# Patient Record
Sex: MALE | URBAN - METROPOLITAN AREA
[De-identification: names, ages, dates, MRNs, and addresses within clinical notes are randomized per-mention and may not be internally consistent; named-entity substitution may affect disease eponyms.]

---

## 2020-01-01 ENCOUNTER — INFUSION - HEALTHEAST (OUTPATIENT)
Dept: INFUSION THERAPY | Facility: CLINIC | Age: 60
End: 2020-01-01

## 2020-01-01 ENCOUNTER — COMMUNICATION - HEALTHEAST (OUTPATIENT)
Dept: SCHEDULING | Facility: CLINIC | Age: 60
End: 2020-01-01

## 2020-01-01 ENCOUNTER — AMBULATORY - HEALTHEAST (OUTPATIENT)
Dept: INFUSION THERAPY | Facility: CLINIC | Age: 60
End: 2020-01-01

## 2020-01-01 ENCOUNTER — COMMUNICATION - HEALTHEAST (OUTPATIENT)
Dept: ONCOLOGY | Facility: HOSPITAL | Age: 60
End: 2020-01-01

## 2020-01-01 ENCOUNTER — AMBULATORY - HEALTHEAST (OUTPATIENT)
Dept: RADIATION ONCOLOGY | Facility: HOSPITAL | Age: 60
End: 2020-01-01

## 2020-01-01 ENCOUNTER — COMMUNICATION - HEALTHEAST (OUTPATIENT)
Dept: ONCOLOGY | Facility: CLINIC | Age: 60
End: 2020-01-01

## 2020-01-01 ENCOUNTER — HOME INFUSION (PRE-WILLOW HOME INFUSION) (OUTPATIENT)
Dept: PHARMACY | Facility: CLINIC | Age: 60
End: 2020-01-01

## 2020-01-01 ENCOUNTER — OFFICE VISIT - HEALTHEAST (OUTPATIENT)
Dept: ONCOLOGY | Facility: CLINIC | Age: 60
End: 2020-01-01

## 2020-01-01 ENCOUNTER — HOSPITAL ENCOUNTER (OUTPATIENT)
Dept: MRI IMAGING | Facility: CLINIC | Age: 60
Setting detail: RADIATION/ONCOLOGY SERIES
Discharge: STILL ADMITTED OR ADMINISTRATIVE | End: 2020-07-06
Attending: INTERNAL MEDICINE

## 2020-01-01 ENCOUNTER — ANESTHESIA - HEALTHEAST (OUTPATIENT)
Dept: SURGERY | Facility: CLINIC | Age: 60
End: 2020-01-01

## 2020-01-01 ENCOUNTER — OFFICE VISIT - HEALTHEAST (OUTPATIENT)
Dept: RADIATION ONCOLOGY | Facility: HOSPITAL | Age: 60
End: 2020-01-01

## 2020-01-01 ENCOUNTER — AMBULATORY - HEALTHEAST (OUTPATIENT)
Dept: ONCOLOGY | Facility: CLINIC | Age: 60
End: 2020-01-01

## 2020-01-01 ENCOUNTER — SURGERY - HEALTHEAST (OUTPATIENT)
Dept: SURGERY | Facility: CLINIC | Age: 60
End: 2020-01-01

## 2020-01-01 ENCOUNTER — RECORDS - HEALTHEAST (OUTPATIENT)
Dept: SCHEDULING | Facility: CLINIC | Age: 60
End: 2020-01-01

## 2020-01-01 ENCOUNTER — NURSE TRIAGE (OUTPATIENT)
Dept: NURSING | Facility: CLINIC | Age: 60
End: 2020-01-01

## 2020-01-01 ENCOUNTER — DOCUMENTATION ONLY (OUTPATIENT)
Dept: OTHER | Facility: CLINIC | Age: 60
End: 2020-01-01

## 2020-01-01 ENCOUNTER — HOSPITAL ENCOUNTER (OUTPATIENT)
Dept: PET IMAGING | Facility: HOSPITAL | Age: 60
Setting detail: RADIATION/ONCOLOGY SERIES
Discharge: STILL ADMITTED OR ADMINISTRATIVE | End: 2020-08-03
Attending: INTERNAL MEDICINE

## 2020-01-01 ENCOUNTER — AMBULATORY - HEALTHEAST (OUTPATIENT)
Dept: OTHER | Facility: CLINIC | Age: 60
End: 2020-01-01

## 2020-01-01 ENCOUNTER — AMBULATORY - HEALTHEAST (OUTPATIENT)
Dept: ONCOLOGY | Facility: HOSPITAL | Age: 60
End: 2020-01-01

## 2020-01-01 ENCOUNTER — RECORDS - HEALTHEAST (OUTPATIENT)
Dept: ADMINISTRATIVE | Facility: OTHER | Age: 60
End: 2020-01-01

## 2020-01-01 ENCOUNTER — HOSPITAL ENCOUNTER (OUTPATIENT)
Dept: INTERVENTIONAL RADIOLOGY/VASCULAR | Facility: HOSPITAL | Age: 60
Discharge: HOME | End: 2020-12-02
Attending: FAMILY MEDICINE | Admitting: RADIOLOGY

## 2020-01-01 ENCOUNTER — COMMUNICATION - HEALTHEAST (OUTPATIENT)
Dept: NUTRITION | Facility: HOSPITAL | Age: 60
End: 2020-01-01

## 2020-01-01 ENCOUNTER — COMMUNICATION - HEALTHEAST (OUTPATIENT)
Dept: LAB | Facility: CLINIC | Age: 60
End: 2020-01-01

## 2020-01-01 ENCOUNTER — AMBULATORY - HEALTHEAST (OUTPATIENT)
Dept: FAMILY MEDICINE | Facility: CLINIC | Age: 60
End: 2020-01-01

## 2020-01-01 ENCOUNTER — OFFICE VISIT - HEALTHEAST (OUTPATIENT)
Dept: ONCOLOGY | Facility: HOSPITAL | Age: 60
End: 2020-01-01

## 2020-01-01 ENCOUNTER — AMBULATORY - HEALTHEAST (OUTPATIENT)
Dept: ULTRASOUND IMAGING | Facility: HOSPITAL | Age: 60
End: 2020-01-01

## 2020-01-01 DIAGNOSIS — C25.0 MALIGNANT NEOPLASM OF HEAD OF PANCREAS (H): ICD-10-CM

## 2020-01-01 DIAGNOSIS — Z51.11 ENCOUNTER FOR ANTINEOPLASTIC CHEMOTHERAPY: ICD-10-CM

## 2020-01-01 DIAGNOSIS — C78.7 LIVER METASTASES: ICD-10-CM

## 2020-01-01 DIAGNOSIS — C25.9 MALIGNANT NEOPLASM OF PANCREAS, UNSPECIFIED LOCATION OF MALIGNANCY (H): ICD-10-CM

## 2020-01-01 DIAGNOSIS — E83.52 HYPERCALCEMIA OF MALIGNANCY: ICD-10-CM

## 2020-01-01 DIAGNOSIS — C79.51 BONE METASTASES: ICD-10-CM

## 2020-01-01 DIAGNOSIS — E43 SEVERE PROTEIN-CALORIE MALNUTRITION (GOMEZ: LESS THAN 60% OF STANDARD WEIGHT) (H): ICD-10-CM

## 2020-01-01 DIAGNOSIS — C78.01 MALIGNANT NEOPLASM METASTATIC TO BOTH LUNGS (H): ICD-10-CM

## 2020-01-01 DIAGNOSIS — R13.19 ESOPHAGEAL DYSPHAGIA: ICD-10-CM

## 2020-01-01 DIAGNOSIS — G89.3 CANCER ASSOCIATED PAIN: ICD-10-CM

## 2020-01-01 DIAGNOSIS — T45.1X5A CHEMOTHERAPY-INDUCED THROMBOCYTOPENIA: ICD-10-CM

## 2020-01-01 DIAGNOSIS — C78.02 MALIGNANT NEOPLASM METASTATIC TO BOTH LUNGS (H): ICD-10-CM

## 2020-01-01 DIAGNOSIS — C78.89 METASTASIS TO SPLEEN (H): ICD-10-CM

## 2020-01-01 DIAGNOSIS — R18.8 OTHER ASCITES: ICD-10-CM

## 2020-01-01 DIAGNOSIS — C25.9 PANCREATIC CANCER (H): ICD-10-CM

## 2020-01-01 DIAGNOSIS — Z11.59 ENCOUNTER FOR SCREENING FOR OTHER VIRAL DISEASES: ICD-10-CM

## 2020-01-01 DIAGNOSIS — D69.59 CHEMOTHERAPY-INDUCED THROMBOCYTOPENIA: ICD-10-CM

## 2020-01-01 DIAGNOSIS — K59.03 CONSTIPATION DUE TO OPIOID THERAPY: ICD-10-CM

## 2020-01-01 DIAGNOSIS — T40.2X5A CONSTIPATION DUE TO OPIOID THERAPY: ICD-10-CM

## 2020-01-01 LAB
ALBUMIN SERPL-MCNC: 2.5 G/DL (ref 3.5–5)
ALBUMIN SERPL-MCNC: 2.6 G/DL (ref 3.5–5)
ALBUMIN SERPL-MCNC: 2.6 G/DL (ref 3.5–5)
ALBUMIN SERPL-MCNC: 2.8 G/DL (ref 3.5–5)
ALBUMIN SERPL-MCNC: 3 G/DL (ref 3.5–5)
ALBUMIN SERPL-MCNC: 3 G/DL (ref 3.5–5)
ALBUMIN SERPL-MCNC: 3.2 G/DL (ref 3.5–5)
ALBUMIN SERPL-MCNC: 3.3 G/DL (ref 3.5–5)
ALP SERPL-CCNC: 145 U/L (ref 45–120)
ALP SERPL-CCNC: 198 U/L (ref 45–120)
ALP SERPL-CCNC: 240 U/L (ref 45–120)
ALP SERPL-CCNC: 241 U/L (ref 45–120)
ALP SERPL-CCNC: 243 U/L (ref 45–120)
ALP SERPL-CCNC: 274 U/L (ref 45–120)
ALP SERPL-CCNC: 295 U/L (ref 45–120)
ALP SERPL-CCNC: 296 U/L (ref 45–120)
ALP SERPL-CCNC: 313 U/L (ref 45–120)
ALP SERPL-CCNC: 331 U/L (ref 45–120)
ALT SERPL W P-5'-P-CCNC: 11 U/L (ref 0–45)
ALT SERPL W P-5'-P-CCNC: 12 U/L (ref 0–45)
ALT SERPL W P-5'-P-CCNC: 13 U/L (ref 0–45)
ALT SERPL W P-5'-P-CCNC: 13 U/L (ref 0–45)
ALT SERPL W P-5'-P-CCNC: 14 U/L (ref 0–45)
ALT SERPL W P-5'-P-CCNC: 14 U/L (ref 0–45)
ALT SERPL W P-5'-P-CCNC: 15 U/L (ref 0–45)
ALT SERPL W P-5'-P-CCNC: 15 U/L (ref 0–45)
ALT SERPL W P-5'-P-CCNC: 19 U/L (ref 0–45)
ALT SERPL W P-5'-P-CCNC: 9 U/L (ref 0–45)
ANION GAP SERPL CALCULATED.3IONS-SCNC: 20 MMOL/L (ref 5–18)
ANION GAP SERPL CALCULATED.3IONS-SCNC: 5 MMOL/L (ref 5–18)
ANION GAP SERPL CALCULATED.3IONS-SCNC: 5 MMOL/L (ref 5–18)
ANION GAP SERPL CALCULATED.3IONS-SCNC: 6 MMOL/L (ref 5–18)
ANION GAP SERPL CALCULATED.3IONS-SCNC: 7 MMOL/L (ref 5–18)
ANION GAP SERPL CALCULATED.3IONS-SCNC: 7 MMOL/L (ref 5–18)
ANION GAP SERPL CALCULATED.3IONS-SCNC: 8 MMOL/L (ref 5–18)
ANION GAP SERPL CALCULATED.3IONS-SCNC: 9 MMOL/L (ref 5–18)
AST SERPL W P-5'-P-CCNC: 17 U/L (ref 0–40)
AST SERPL W P-5'-P-CCNC: 31 U/L (ref 0–40)
AST SERPL W P-5'-P-CCNC: 37 U/L (ref 0–40)
AST SERPL W P-5'-P-CCNC: 38 U/L (ref 0–40)
AST SERPL W P-5'-P-CCNC: 39 U/L (ref 0–40)
AST SERPL W P-5'-P-CCNC: 40 U/L (ref 0–40)
AST SERPL W P-5'-P-CCNC: 41 U/L (ref 0–40)
AST SERPL W P-5'-P-CCNC: 42 U/L (ref 0–40)
AST SERPL W P-5'-P-CCNC: 46 U/L (ref 0–40)
AST SERPL W P-5'-P-CCNC: 52 U/L (ref 0–40)
BASO STIPL BLD QL SMEAR: ABNORMAL
BASO STIPL BLD QL SMEAR: ABNORMAL
BASOPHILS # BLD AUTO: 0 THOU/UL (ref 0–0.2)
BASOPHILS NFR BLD AUTO: 0 % (ref 0–2)
BILIRUB SERPL-MCNC: 0.5 MG/DL (ref 0–1)
BILIRUB SERPL-MCNC: 0.6 MG/DL (ref 0–1)
BILIRUB SERPL-MCNC: 0.7 MG/DL (ref 0–1)
BILIRUB SERPL-MCNC: 0.7 MG/DL (ref 0–1)
BILIRUB SERPL-MCNC: 0.9 MG/DL (ref 0–1)
BILIRUB SERPL-MCNC: 2.1 MG/DL (ref 0–1)
BUN SERPL-MCNC: 10 MG/DL (ref 8–22)
BUN SERPL-MCNC: 11 MG/DL (ref 8–22)
BUN SERPL-MCNC: 13 MG/DL (ref 8–22)
BUN SERPL-MCNC: 13 MG/DL (ref 8–22)
BUN SERPL-MCNC: 15 MG/DL (ref 8–22)
BUN SERPL-MCNC: 15 MG/DL (ref 8–22)
BUN SERPL-MCNC: 16 MG/DL (ref 8–22)
BUN SERPL-MCNC: 16 MG/DL (ref 8–22)
BUN SERPL-MCNC: 43 MG/DL (ref 8–22)
BUN SERPL-MCNC: 9 MG/DL (ref 8–22)
CALCIUM SERPL-MCNC: 11.6 MG/DL (ref 8.5–10.5)
CALCIUM SERPL-MCNC: 12.3 MG/DL (ref 8.5–10.5)
CALCIUM SERPL-MCNC: 13.1 MG/DL (ref 8.5–10.5)
CALCIUM SERPL-MCNC: 13.3 MG/DL (ref 8.5–10.5)
CALCIUM SERPL-MCNC: 13.5 MG/DL (ref 8.5–10.5)
CALCIUM SERPL-MCNC: 7.4 MG/DL (ref 8.5–10.5)
CALCIUM SERPL-MCNC: 7.4 MG/DL (ref 8.5–10.5)
CALCIUM SERPL-MCNC: 8.2 MG/DL (ref 8.5–10.5)
CALCIUM SERPL-MCNC: 8.7 MG/DL (ref 8.5–10.5)
CALCIUM SERPL-MCNC: 9.9 MG/DL (ref 8.5–10.5)
CANCER AG19-9 SERPL IA-ACNC: 26 U/ML (ref 0–37)
CANCER AG19-9 SERPL IA-ACNC: 58 U/ML (ref 0–37)
CANCER AG19-9 SERPL IA-ACNC: 60 U/ML (ref 0–37)
CANCER AG19-9 SERPL IA-ACNC: 70 U/ML (ref 0–37)
CANCER AG19-9 SERPL IA-ACNC: 71 U/ML (ref 0–37)
CANCER AG19-9 SERPL IA-ACNC: 87 U/ML (ref 0–37)
CANCER AG19-9 SERPL IA-ACNC: 91 U/ML (ref 0–37)
CHLORIDE BLD-SCNC: 103 MMOL/L (ref 98–107)
CHLORIDE BLD-SCNC: 103 MMOL/L (ref 98–107)
CHLORIDE BLD-SCNC: 104 MMOL/L (ref 98–107)
CHLORIDE BLD-SCNC: 104 MMOL/L (ref 98–107)
CHLORIDE BLD-SCNC: 106 MMOL/L (ref 98–107)
CHLORIDE BLD-SCNC: 106 MMOL/L (ref 98–107)
CHLORIDE BLD-SCNC: 107 MMOL/L (ref 98–107)
CHLORIDE BLD-SCNC: 98 MMOL/L (ref 98–107)
CO2 SERPL-SCNC: 14 MMOL/L (ref 22–31)
CO2 SERPL-SCNC: 22 MMOL/L (ref 22–31)
CO2 SERPL-SCNC: 25 MMOL/L (ref 22–31)
CO2 SERPL-SCNC: 26 MMOL/L (ref 22–31)
CO2 SERPL-SCNC: 27 MMOL/L (ref 22–31)
CO2 SERPL-SCNC: 27 MMOL/L (ref 22–31)
CREAT SERPL-MCNC: 0.61 MG/DL (ref 0.7–1.3)
CREAT SERPL-MCNC: 0.62 MG/DL (ref 0.7–1.3)
CREAT SERPL-MCNC: 0.63 MG/DL (ref 0.7–1.3)
CREAT SERPL-MCNC: 0.64 MG/DL (ref 0.7–1.3)
CREAT SERPL-MCNC: 0.64 MG/DL (ref 0.7–1.3)
CREAT SERPL-MCNC: 0.65 MG/DL (ref 0.7–1.3)
CREAT SERPL-MCNC: 0.66 MG/DL (ref 0.7–1.3)
CREAT SERPL-MCNC: 0.67 MG/DL (ref 0.7–1.3)
CREAT SERPL-MCNC: 0.68 MG/DL (ref 0.7–1.3)
CREAT SERPL-MCNC: 1.79 MG/DL (ref 0.7–1.3)
EOSINOPHIL # BLD AUTO: 0 THOU/UL (ref 0–0.4)
EOSINOPHIL COUNT (ABSOLUTE): 0 THOU/UL (ref 0–0.4)
EOSINOPHIL COUNT (ABSOLUTE): 0 THOU/UL (ref 0–0.4)
EOSINOPHIL NFR BLD AUTO: 0 % (ref 0–6)
EOSINOPHIL NFR BLD AUTO: 1 % (ref 0–6)
ERYTHROCYTE [DISTWIDTH] IN BLOOD BY AUTOMATED COUNT: 15 % (ref 11–14.5)
ERYTHROCYTE [DISTWIDTH] IN BLOOD BY AUTOMATED COUNT: 15.9 % (ref 11–14.5)
ERYTHROCYTE [DISTWIDTH] IN BLOOD BY AUTOMATED COUNT: 16.2 % (ref 11–14.5)
ERYTHROCYTE [DISTWIDTH] IN BLOOD BY AUTOMATED COUNT: 17.1 % (ref 11–14.5)
ERYTHROCYTE [DISTWIDTH] IN BLOOD BY AUTOMATED COUNT: 17.5 % (ref 11–14.5)
ERYTHROCYTE [DISTWIDTH] IN BLOOD BY AUTOMATED COUNT: 19.4 % (ref 11–14.5)
ERYTHROCYTE [DISTWIDTH] IN BLOOD BY AUTOMATED COUNT: 19.6 % (ref 11–14.5)
ERYTHROCYTE [DISTWIDTH] IN BLOOD BY AUTOMATED COUNT: 20.4 % (ref 11–14.5)
ERYTHROCYTE [DISTWIDTH] IN BLOOD BY AUTOMATED COUNT: 21.3 % (ref 11–14.5)
ERYTHROCYTE [DISTWIDTH] IN BLOOD BY AUTOMATED COUNT: 21.9 % (ref 11–14.5)
GFR SERPL CREATININE-BSD FRML MDRD: 39 ML/MIN/1.73M2
GFR SERPL CREATININE-BSD FRML MDRD: >60 ML/MIN/1.73M2
GLUCOSE BLD-MCNC: 108 MG/DL (ref 70–125)
GLUCOSE BLD-MCNC: 115 MG/DL (ref 70–125)
GLUCOSE BLD-MCNC: 118 MG/DL (ref 70–125)
GLUCOSE BLD-MCNC: 121 MG/DL (ref 70–125)
GLUCOSE BLD-MCNC: 123 MG/DL (ref 70–125)
GLUCOSE BLD-MCNC: 126 MG/DL (ref 70–125)
GLUCOSE BLD-MCNC: 129 MG/DL (ref 70–125)
GLUCOSE BLD-MCNC: 201 MG/DL (ref 70–125)
GLUCOSE BLDC GLUCOMTR-MCNC: 104 MG/DL (ref 70–139)
HCT VFR BLD AUTO: 23.4 % (ref 40–54)
HCT VFR BLD AUTO: 26.9 % (ref 40–54)
HCT VFR BLD AUTO: 27.3 % (ref 40–54)
HCT VFR BLD AUTO: 28.9 % (ref 40–54)
HCT VFR BLD AUTO: 29.2 % (ref 40–54)
HCT VFR BLD AUTO: 29.7 % (ref 40–54)
HCT VFR BLD AUTO: 32.4 % (ref 40–54)
HCT VFR BLD AUTO: 33.6 % (ref 40–54)
HCT VFR BLD AUTO: 35.6 % (ref 40–54)
HCT VFR BLD AUTO: 36.8 % (ref 40–54)
HGB BLD-MCNC: 10.5 G/DL (ref 14–18)
HGB BLD-MCNC: 10.8 G/DL (ref 14–18)
HGB BLD-MCNC: 11.4 G/DL (ref 14–18)
HGB BLD-MCNC: 11.8 G/DL (ref 14–18)
HGB BLD-MCNC: 7.6 G/DL (ref 14–18)
HGB BLD-MCNC: 8.5 G/DL (ref 14–18)
HGB BLD-MCNC: 8.8 G/DL (ref 14–18)
HGB BLD-MCNC: 9.1 G/DL (ref 14–18)
HGB BLD-MCNC: 9.7 G/DL (ref 14–18)
HGB BLD-MCNC: 9.8 G/DL (ref 14–18)
IMM GRANULOCYTES # BLD: 0 THOU/UL
IMM GRANULOCYTES # BLD: 0.1 THOU/UL
IMM GRANULOCYTES # BLD: 0.1 THOU/UL
IMM GRANULOCYTES # BLD: 0.2 THOU/UL
IMM GRANULOCYTES NFR BLD: 1 %
IMM GRANULOCYTES NFR BLD: 2 %
IMMATURE GRANULOCYTE % - MAN (DIFF): 1 %
IMMATURE GRANULOCYTE % - MAN (DIFF): 5 %
IMMATURE GRANULOCYTE ABSOLUTE - MAN (DIFF): 0.1 THOU/UL
IMMATURE GRANULOCYTE ABSOLUTE - MAN (DIFF): 0.6 THOU/UL
LYMPHOCYTES # BLD AUTO: 0.8 THOU/UL (ref 0.8–4.4)
LYMPHOCYTES # BLD AUTO: 0.9 THOU/UL (ref 0.8–4.4)
LYMPHOCYTES # BLD AUTO: 0.9 THOU/UL (ref 0.8–4.4)
LYMPHOCYTES # BLD AUTO: 1 THOU/UL (ref 0.8–4.4)
LYMPHOCYTES # BLD AUTO: 1 THOU/UL (ref 0.8–4.4)
LYMPHOCYTES # BLD AUTO: 1.2 THOU/UL (ref 0.8–4.4)
LYMPHOCYTES # BLD AUTO: 1.3 THOU/UL (ref 0.8–4.4)
LYMPHOCYTES # BLD AUTO: 1.3 THOU/UL (ref 0.8–4.4)
LYMPHOCYTES # BLD AUTO: 1.4 THOU/UL (ref 0.8–4.4)
LYMPHOCYTES # BLD AUTO: 1.4 THOU/UL (ref 0.8–4.4)
LYMPHOCYTES NFR BLD AUTO: 13 % (ref 20–40)
LYMPHOCYTES NFR BLD AUTO: 13 % (ref 20–40)
LYMPHOCYTES NFR BLD AUTO: 17 % (ref 20–40)
LYMPHOCYTES NFR BLD AUTO: 18 % (ref 20–40)
LYMPHOCYTES NFR BLD AUTO: 19 % (ref 20–40)
LYMPHOCYTES NFR BLD AUTO: 23 % (ref 20–40)
LYMPHOCYTES NFR BLD AUTO: 24 % (ref 20–40)
LYMPHOCYTES NFR BLD AUTO: 8 % (ref 20–40)
LYMPHOCYTES NFR BLD AUTO: 9 % (ref 20–40)
LYMPHOCYTES NFR BLD AUTO: 9 % (ref 20–40)
MAGNESIUM SERPL-MCNC: 1.8 MG/DL (ref 1.8–2.6)
MAGNESIUM SERPL-MCNC: 2.1 MG/DL (ref 1.8–2.6)
MAGNESIUM SERPL-MCNC: 2.1 MG/DL (ref 1.8–2.6)
MAGNESIUM SERPL-MCNC: 2.2 MG/DL (ref 1.8–2.6)
MAGNESIUM SERPL-MCNC: 2.2 MG/DL (ref 1.8–2.6)
MAGNESIUM SERPL-MCNC: 2.4 MG/DL (ref 1.8–2.6)
MAGNESIUM SERPL-MCNC: 2.5 MG/DL (ref 1.8–2.6)
MAGNESIUM SERPL-MCNC: 2.7 MG/DL (ref 1.8–2.6)
MANUAL NRBC PER 100 CELLS: 1
MCH RBC QN AUTO: 29.1 PG (ref 27–34)
MCH RBC QN AUTO: 29.2 PG (ref 27–34)
MCH RBC QN AUTO: 29.3 PG (ref 27–34)
MCH RBC QN AUTO: 29.5 PG (ref 27–34)
MCH RBC QN AUTO: 30 PG (ref 27–34)
MCH RBC QN AUTO: 30.4 PG (ref 27–34)
MCH RBC QN AUTO: 30.6 PG (ref 27–34)
MCH RBC QN AUTO: 31.2 PG (ref 27–34)
MCH RBC QN AUTO: 31.6 PG (ref 27–34)
MCH RBC QN AUTO: 32 PG (ref 27–34)
MCHC RBC AUTO-ENTMCNC: 31.2 G/DL (ref 32–36)
MCHC RBC AUTO-ENTMCNC: 31.6 G/DL (ref 32–36)
MCHC RBC AUTO-ENTMCNC: 32 G/DL (ref 32–36)
MCHC RBC AUTO-ENTMCNC: 32.1 G/DL (ref 32–36)
MCHC RBC AUTO-ENTMCNC: 32.1 G/DL (ref 32–36)
MCHC RBC AUTO-ENTMCNC: 32.2 G/DL (ref 32–36)
MCHC RBC AUTO-ENTMCNC: 32.4 G/DL (ref 32–36)
MCHC RBC AUTO-ENTMCNC: 32.5 G/DL (ref 32–36)
MCHC RBC AUTO-ENTMCNC: 32.7 G/DL (ref 32–36)
MCHC RBC AUTO-ENTMCNC: 33.9 G/DL (ref 32–36)
MCV RBC AUTO: 100 FL (ref 80–100)
MCV RBC AUTO: 91 FL (ref 80–100)
MCV RBC AUTO: 92 FL (ref 80–100)
MCV RBC AUTO: 93 FL (ref 80–100)
MCV RBC AUTO: 94 FL (ref 80–100)
MCV RBC AUTO: 94 FL (ref 80–100)
MCV RBC AUTO: 96 FL (ref 80–100)
MCV RBC AUTO: 97 FL (ref 80–100)
METAMYELOCYTES (ABSOLUTE): 0.3 THOU/UL
METAMYELOCYTES NFR BLD MANUAL: 3 %
MONOCYTES # BLD AUTO: 0.6 THOU/UL (ref 0–0.9)
MONOCYTES # BLD AUTO: 0.7 THOU/UL (ref 0–0.9)
MONOCYTES # BLD AUTO: 0.8 THOU/UL (ref 0–0.9)
MONOCYTES # BLD AUTO: 0.9 THOU/UL (ref 0–0.9)
MONOCYTES # BLD AUTO: 0.9 THOU/UL (ref 0–0.9)
MONOCYTES NFR BLD AUTO: 10 % (ref 2–10)
MONOCYTES NFR BLD AUTO: 11 % (ref 2–10)
MONOCYTES NFR BLD AUTO: 11 % (ref 2–10)
MONOCYTES NFR BLD AUTO: 12 % (ref 2–10)
MONOCYTES NFR BLD AUTO: 12 % (ref 2–10)
MONOCYTES NFR BLD AUTO: 16 % (ref 2–10)
MONOCYTES NFR BLD AUTO: 7 % (ref 2–10)
MONOCYTES NFR BLD AUTO: 7 % (ref 2–10)
MONOCYTES NFR BLD AUTO: 8 % (ref 2–10)
MONOCYTES NFR BLD AUTO: 9 % (ref 2–10)
MYELOCYTES (ABSOLUTE): 0.1 THOU/UL
MYELOCYTES (ABSOLUTE): 0.2 THOU/UL
MYELOCYTES NFR BLD MANUAL: 1 %
MYELOCYTES NFR BLD MANUAL: 2 %
NEUTROPHILS # BLD AUTO: 2.5 THOU/UL (ref 2–7.7)
NEUTROPHILS # BLD AUTO: 3.5 THOU/UL (ref 2–7.7)
NEUTROPHILS # BLD AUTO: 4.4 THOU/UL (ref 2–7.7)
NEUTROPHILS # BLD AUTO: 4.7 THOU/UL (ref 2–7.7)
NEUTROPHILS # BLD AUTO: 5.5 THOU/UL (ref 2–7.7)
NEUTROPHILS # BLD AUTO: 5.9 THOU/UL (ref 2–7.7)
NEUTROPHILS # BLD AUTO: 9 THOU/UL (ref 2–7.7)
NEUTROPHILS # BLD AUTO: 9.7 THOU/UL (ref 2–7.7)
NEUTROPHILS NFR BLD AUTO: 61 % (ref 50–70)
NEUTROPHILS NFR BLD AUTO: 64 % (ref 50–70)
NEUTROPHILS NFR BLD AUTO: 67 % (ref 50–70)
NEUTROPHILS NFR BLD AUTO: 69 % (ref 50–70)
NEUTROPHILS NFR BLD AUTO: 73 % (ref 50–70)
NEUTROPHILS NFR BLD AUTO: 78 % (ref 50–70)
NEUTROPHILS NFR BLD AUTO: 82 % (ref 50–70)
NEUTROPHILS NFR BLD AUTO: 84 % (ref 50–70)
OVALOCYTES: ABNORMAL
PLAT MORPH BLD: ABNORMAL
PLAT MORPH BLD: NORMAL
PLATELET # BLD AUTO: 102 THOU/UL (ref 140–440)
PLATELET # BLD AUTO: 106 THOU/UL (ref 140–440)
PLATELET # BLD AUTO: 107 THOU/UL (ref 140–440)
PLATELET # BLD AUTO: 111 THOU/UL (ref 140–440)
PLATELET # BLD AUTO: 117 THOU/UL (ref 140–440)
PLATELET # BLD AUTO: 123 THOU/UL (ref 140–440)
PLATELET # BLD AUTO: 141 THOU/UL (ref 140–440)
PLATELET # BLD AUTO: 164 THOU/UL (ref 140–440)
PLATELET # BLD AUTO: 185 THOU/UL (ref 140–440)
PLATELET # BLD AUTO: 82 THOU/UL (ref 140–440)
PMV BLD AUTO: 10.1 FL (ref 8.5–12.5)
PMV BLD AUTO: 10.3 FL (ref 8.5–12.5)
PMV BLD AUTO: 10.4 FL (ref 8.5–12.5)
PMV BLD AUTO: 10.6 FL (ref 8.5–12.5)
PMV BLD AUTO: 10.7 FL (ref 8.5–12.5)
PMV BLD AUTO: 10.7 FL (ref 8.5–12.5)
PMV BLD AUTO: 10.8 FL (ref 8.5–12.5)
PMV BLD AUTO: 11.4 FL (ref 8.5–12.5)
PMV BLD AUTO: 11.4 FL (ref 8.5–12.5)
PMV BLD AUTO: 11.9 FL (ref 8.5–12.5)
POLYCHROMASIA BLD QL SMEAR: ABNORMAL
POTASSIUM BLD-SCNC: 3.3 MMOL/L (ref 3.5–5)
POTASSIUM BLD-SCNC: 3.5 MMOL/L (ref 3.5–5)
POTASSIUM BLD-SCNC: 3.8 MMOL/L (ref 3.5–5)
POTASSIUM BLD-SCNC: 3.9 MMOL/L (ref 3.5–5)
POTASSIUM BLD-SCNC: 4.1 MMOL/L (ref 3.5–5)
POTASSIUM BLD-SCNC: 4.3 MMOL/L (ref 3.5–5)
POTASSIUM BLD-SCNC: 4.3 MMOL/L (ref 3.5–5)
POTASSIUM BLD-SCNC: 4.6 MMOL/L (ref 3.5–5)
POTASSIUM BLD-SCNC: 4.6 MMOL/L (ref 3.5–5)
POTASSIUM BLD-SCNC: 4.8 MMOL/L (ref 3.5–5)
PROT SERPL-MCNC: 6 G/DL (ref 6–8)
PROT SERPL-MCNC: 6.2 G/DL (ref 6–8)
PROT SERPL-MCNC: 6.4 G/DL (ref 6–8)
PROT SERPL-MCNC: 6.5 G/DL (ref 6–8)
PROT SERPL-MCNC: 6.8 G/DL (ref 6–8)
PROT SERPL-MCNC: 7 G/DL (ref 6–8)
PROT SERPL-MCNC: 7.1 G/DL (ref 6–8)
PROT SERPL-MCNC: 7.4 G/DL (ref 6–8)
PROT SERPL-MCNC: 7.6 G/DL (ref 6–8)
PROT SERPL-MCNC: 7.6 G/DL (ref 6–8)
RBC # BLD AUTO: 2.48 MILL/UL (ref 4.4–6.2)
RBC # BLD AUTO: 2.69 MILL/UL (ref 4.4–6.2)
RBC # BLD AUTO: 2.82 MILL/UL (ref 4.4–6.2)
RBC # BLD AUTO: 3.03 MILL/UL (ref 4.4–6.2)
RBC # BLD AUTO: 3.06 MILL/UL (ref 4.4–6.2)
RBC # BLD AUTO: 3.19 MILL/UL (ref 4.4–6.2)
RBC # BLD AUTO: 3.58 MILL/UL (ref 4.4–6.2)
RBC # BLD AUTO: 3.71 MILL/UL (ref 4.4–6.2)
RBC # BLD AUTO: 3.9 MILL/UL (ref 4.4–6.2)
RBC # BLD AUTO: 4 MILL/UL (ref 4.4–6.2)
REACTIVE LYMPHS: ABNORMAL
SARS-COV-2 RNA SPEC QL NAA+PROBE: ABNORMAL
SARS-COV-2 RNA SPEC QL NAA+PROBE: ABNORMAL
SCHISTOCYTES: ABNORMAL
SODIUM SERPL-SCNC: 132 MMOL/L (ref 136–145)
SODIUM SERPL-SCNC: 136 MMOL/L (ref 136–145)
SODIUM SERPL-SCNC: 136 MMOL/L (ref 136–145)
SODIUM SERPL-SCNC: 137 MMOL/L (ref 136–145)
SODIUM SERPL-SCNC: 138 MMOL/L (ref 136–145)
SODIUM SERPL-SCNC: 139 MMOL/L (ref 136–145)
SODIUM SERPL-SCNC: 141 MMOL/L (ref 136–145)
SPECIMEN SOURCE: ABNORMAL
SPECIMEN SOURCE: ABNORMAL
TEAR DROP: ABNORMAL
TEAR DROP: ABNORMAL
TOTAL NEUTROPHILS-ABS(DIFF): 6.8 THOU/UL (ref 2–7.7)
TOTAL NEUTROPHILS-ABS(DIFF): 8.3 THOU/UL (ref 2–7.7)
TOTAL NEUTROPHILS-REL(DIFF): 75 % (ref 50–70)
TOTAL NEUTROPHILS-REL(DIFF): 79 % (ref 50–70)
TOXIC GRANULATION: ABNORMAL
WBC: 10.7 THOU/UL (ref 4–11)
WBC: 11.1 THOU/UL (ref 4–11)
WBC: 11.8 THOU/UL (ref 4–11)
WBC: 4.2 THOU/UL (ref 4–11)
WBC: 5.5 THOU/UL (ref 4–11)
WBC: 6.6 THOU/UL (ref 4–11)
WBC: 6.8 THOU/UL (ref 4–11)
WBC: 7.1 THOU/UL (ref 4–11)
WBC: 8.2 THOU/UL (ref 4–11)
WBC: 8.6 THOU/UL (ref 4–11)

## 2020-01-01 PROCEDURE — U0003 INFECTIOUS AGENT DETECTION BY NUCLEIC ACID (DNA OR RNA); SEVERE ACUTE RESPIRATORY SYNDROME CORONAVIRUS 2 (SARS-COV-2) (CORONAVIRUS DISEASE [COVID-19]), AMPLIFIED PROBE TECHNIQUE, MAKING USE OF HIGH THROUGHPUT TECHNOLOGIES AS DESCRIBED BY CMS-2020-01-R: HCPCS | Performed by: NURSE PRACTITIONER

## 2020-01-01 RX ORDER — SPIRONOLACTONE 100 MG/1
100 TABLET, FILM COATED ORAL DAILY
Qty: 30 TABLET | Refills: 1 | Status: SHIPPED | OUTPATIENT
Start: 2020-01-01

## 2020-01-01 RX ORDER — MECOBALAMIN 5000 MCG
15 TABLET,DISINTEGRATING ORAL DAILY
Qty: 30 CAPSULE | Refills: 3 | Status: SHIPPED | OUTPATIENT
Start: 2020-01-01

## 2020-01-01 RX ORDER — MORPHINE SULFATE 10 MG/5ML
15 SOLUTION ORAL EVERY 4 HOURS
Status: SHIPPED | COMMUNITY
Start: 2020-01-01

## 2020-01-01 RX ORDER — LIDOCAINE/PRILOCAINE 2.5 %-2.5%
CREAM (GRAM) TOPICAL
Qty: 15 G | Refills: 1 | Status: SHIPPED | OUTPATIENT
Start: 2020-01-01

## 2020-01-01 RX ORDER — PROCHLORPERAZINE MALEATE 10 MG
10 TABLET ORAL EVERY 6 HOURS PRN
Qty: 30 TABLET | Refills: 1 | Status: SHIPPED | OUTPATIENT
Start: 2020-01-01

## 2020-01-01 RX ORDER — FAMOTIDINE 20 MG/1
20 TABLET, FILM COATED ORAL 2 TIMES DAILY PRN
Status: SHIPPED | COMMUNITY
Start: 2020-01-01

## 2020-01-01 ASSESSMENT — MIFFLIN-ST. JEOR
SCORE: 1683.97
SCORE: 1810.97
SCORE: 1800.09
SCORE: 1794.19
SCORE: 1784.21
SCORE: 1786.48
SCORE: 1795.1

## 2020-06-08 NOTE — PROGRESS NOTES
This is a recent snapshot of the patient's [facility] medical record.  For current drug dose and complete information and questions, call [phone] or In [facility] pool, fv home infusion ([id number])  CSN Number:  [id number]

## 2020-06-08 NOTE — PROGRESS NOTES
This is a recent snapshot of the patient's [facility] medical record.  For current drug dose and complete information and questions, call [phone] or In [facility] pool, fv home infusion ([id number])  CSN Number:  [id number]

## 2020-06-08 NOTE — PROGRESS NOTES
This is a recent snapshot of the patient's [facility] medical record.  For current drug dose and complete information and questions, call [phone] or In Basket pool, fv home infusion ([id number])  CSN Number:  [id number]

## 2020-06-15 NOTE — PROGRESS NOTES
This is a recent snapshot of the patient's [facility] medical record.  For current drug dose and complete information and questions, call [phone] or In Basket pool, fv home infusion ([id number])  CSN Number:  [id number]

## 2020-06-15 NOTE — PROGRESS NOTES
This is a recent snapshot of the patient's [facility] medical record.  For current drug dose and complete information and questions, call [phone] or In Basket pool, fv home infusion ([id number])  CSN Number:  [id number]

## 2020-06-15 NOTE — PROGRESS NOTES
This is a recent snapshot of the patient's [facility] medical record.  For current drug dose and complete information and questions, call [phone] or In Basket pool, fv home infusion ([id number])  CSN Number:  [id number]

## 2020-06-26 NOTE — PROGRESS NOTES
This is a recent snapshot of the patient's [facility] medical record.  For current drug dose and complete information and questions, call [phone] or In [facility] pool, fv home infusion ([id number])  CSN Number:  [id number]

## 2020-07-06 NOTE — PROGRESS NOTES
This is a recent snapshot of the patient's [facility] medical record.  For current drug dose and complete information and questions, call [phone] or In Basket pool, fv home infusion ([id number])  CSN Number:  [id number]

## 2020-08-31 NOTE — PROGRESS NOTES
This is a recent snapshot of the patient's [facility] medical record.  For current drug dose and complete information and questions, call [phone] or In Basket pool, fv home infusion ([id number])  CSN Number:  [id number]

## 2020-10-05 NOTE — PROGRESS NOTES
This is a recent snapshot of the patient's [facility] medical record.  For current drug dose and complete information and questions, call [phone] or In Basket pool, fv home infusion ([id number])  CSN Number:  [id number]

## 2020-11-02 NOTE — PROGRESS NOTES
This is a recent snapshot of the patient's [facility] medical record.  For current drug dose and complete information and questions, call [phone] or In [facility] pool, fv home infusion ([id number])  CSN Number:  [id number]

## 2020-11-03 NOTE — PROGRESS NOTES
This is a recent snapshot of the patient's [facility] medical record.  For current drug dose and complete information and questions, call [phone] or In Basket pool, fv home infusion ([id number])  CSN Number:  [id number]

## 2020-11-07 NOTE — TELEPHONE ENCOUNTER
"Coronavirus (COVID-19) Notification    Caller Name (Patient, parent, daughter/son, grandparent, etc)  Johnie    Reason for call  Notify of Positive Coronavirus (COVID-19) lab results, assess symptoms,  review [facility] recommendations    Lab Result    Lab test:  2019-nCoV rRt-PCR or SARS-CoV-2 PCR    Oropharyngeal AND/OR nasopharyngeal swabs is POSITIVE for 2019-nCoV RNA/SARS-COV-2 PCR (COVID-19 virus)    RN Recommendations/Instructions per [facility] Coronavirus COVID-19 recommendations    Brief introduction script  Introduce self then review script:  \"I am calling on behalf of [employer].  We were notified that your Coronavirus test (COVID-19) for was POSITIVE for the virus.  I have some information to relay to you but first I wanted to mention that the [place] Dept of Health will be contacting you shortly [it's possible MD already called Patient] to talk to you more about how you are feeling and other people you have had contact with who might now also have the virus.  Also, [facility] is Partnering with the [facility] for Covid-19 research, you may be contacted directly by research staff.\"    Assessment (Inquire about Patient's current symptoms)   Assessment   Current Symptoms at time of phone call: (if no symptoms, document No symptoms] No Symptoms   Symptoms onset (if applicable) Not applicable, no symptoms     If at time of call, Patients symptoms hare worsened, the Patient should contact 911 or have someone drive them to Emergency Dept promptly:      If Patient calling 911, inform 911 personal that you have tested positive for the Coronavirus (COVID-19).  Place mask on and await 911 to arrive.    If Emergency Dept, If possible, please have another adult drive you to the Emergency Dept but you need to wear mask when in contact with other people.      Review information with Patient    Your result was positive. This means you have COVID-19 (coronavirus).  We have sent you a letter " that reviews the information that I'll be reviewing with you now.    How can I protect others?    If you have symptoms: stay home and away from others (self-isolate) until:    You've had no fever--and no medicine that reduces fever--for 1 full day (24 hours). And       Your other symptoms have gotten better. For example, your cough or breathing has improved. And     At least 10 days have passed since your symptoms started. (If you've been told by a doctor that you have a weak immune system, wait 20 days.)     If you don't have symptoms: Stay home and away from others (self-isolate) until at least 10 days have passed since your first positive COVID-19 test. (Date test collected)    During this time:    Stay in your own room, including for meals. Use your own bathroom if you can.    Stay away from others in your home. No hugging, kissing or shaking hands. No visitors.     Don't go to work, school or anywhere else.     Clean  high touch  surfaces often (doorknobs, counters, handles, etc.). Use a household cleaning spray or wipes. You'll find a full list on the EPA website at www.epa.gov/pesticide-registration/list-n-disinfectants-use-against-sars-cov-2.     Cover your mouth and nose with a mask, tissue or other face covering to avoid spreading germs.    Wash your hands and face often with soap and water.    Caregivers in these groups are at risk for severe illness due to COVID-19:  o People 65 years and older  o People who live in a nursing home or long-term care facility  o People with chronic disease (lung, heart, cancer, diabetes, kidney, liver, immunologic)  o People who have a weakened immune system, including those who:  - Are in cancer treatment  - Take medicine that weakens the immune system, such as corticosteroids  - Had a bone marrow or organ transplant  - Have an immune deficiency  - Have poorly controlled HIV or AIDS  - Are obese (body mass index of 40 or higher)  - Smoke regularly    Caregivers should  wear gloves while washing dishes, handling laundry and cleaning bedrooms and bathrooms.    Wash and dry laundry with special caution. Don't shake dirty laundry, and use the warmest water setting you can.    If you have a weakened immune system, ask your doctor about other actions you should take.    For more tips, go to www.cdc.gov/coronavirus/2019-ncov/downloads/10Things.pdf.    You should not go back to work until you meet the guidelines above for ending your home isolation. You don't need to be retested for COVID-19 before going back to work--studies show that you won't spread the virus if it's been at least 10 days since your symptoms started (or 20 days, if you have a weak immune system).    Employers: This document serves as formal notice of your employee's medical guidelines for going back to work. They must meet the above guidelines before going back to work in person.    How can I take care of myself?    1. Get lots of rest. Drink extra fluids (unless a doctor has told you not to).    2. Take Tylenol (acetaminophen) for fever or pain. If you have liver or kidney problems, ask your family doctor if it's okay to take Tylenol.     Take either:     650 mg (two 325 mg pills) every 4 to 6 hours, or     1,000 mg (two 500 mg pills) every 8 hours as needed.     Note: Don't take more than 3,000 mg in one day. Acetaminophen is found in many medicines (both prescribed and over-the-counter medicines). Read all labels to be sure you don't take too much.    For children, check the Tylenol bottle for the right dose (based on their age or weight).    3. If you have other health problems (like cancer, heart failure, an organ transplant or severe kidney disease): Call your specialty clinic if you don't feel better in the next 2 days.    4. Know when to call 911: Emergency warning signs include:    Trouble breathing or shortness of breath    Pain or pressure in the chest that doesn't go away    Feeling confused like you  haven't felt before, or not being able to wake up    Bluish-colored lips or face    5. Sign up for [employer]. We know it's scary to hear that you have COVID-19. We want to track your symptoms to make sure you're okay over the next 2 weeks. Please look for an email from [employer]--this is a free, online program that we'll use to keep in touch. To sign up, follow the link in the email. Learn more at [email or web address].    Where can I get more information?    [employer]: [email or web address]    Coronavirus Basics: www.health.[facility].[place]./diseases/coronavirus/basics.html    What to Do If You're Sick: www.cdc.gov/coronavirus/2019-ncov/about/steps-when-sick.html    Ending Home Isolation: www.cdc.gov/coronavirus/2019-ncov/hcp/disposition-in-home-patients.html     Caring for Someone with COVID-19: www.cdc.gov/coronavirus/2019-ncov/if-you-are-sick/care-for-someone.html     [facility] clinical trials (COVID-19 research studies): [email or web address].[facility]-clinical-trials     A Positive COVID-19 letter will be sent via [employer] or the mail. (Exception, no letters sent to Presurgerical/Preprocedure Patients)    [Name]  Keira Zuleta RN/[facility]

## 2021-05-26 VITALS
DIASTOLIC BLOOD PRESSURE: 68 MMHG | HEART RATE: 100 BPM | TEMPERATURE: 97.9 F | SYSTOLIC BLOOD PRESSURE: 117 MMHG | OXYGEN SATURATION: 96 %

## 2021-05-26 VITALS
SYSTOLIC BLOOD PRESSURE: 136 MMHG | DIASTOLIC BLOOD PRESSURE: 76 MMHG | HEART RATE: 94 BPM | OXYGEN SATURATION: 98 % | TEMPERATURE: 97.9 F

## 2021-05-26 VITALS
TEMPERATURE: 97.8 F | SYSTOLIC BLOOD PRESSURE: 141 MMHG | OXYGEN SATURATION: 97 % | HEART RATE: 95 BPM | DIASTOLIC BLOOD PRESSURE: 72 MMHG

## 2021-05-26 VITALS
TEMPERATURE: 98.5 F | SYSTOLIC BLOOD PRESSURE: 103 MMHG | DIASTOLIC BLOOD PRESSURE: 57 MMHG | HEART RATE: 80 BPM | OXYGEN SATURATION: 96 %

## 2021-05-27 VITALS
TEMPERATURE: 97.7 F | OXYGEN SATURATION: 95 % | SYSTOLIC BLOOD PRESSURE: 158 MMHG | HEART RATE: 97 BPM | DIASTOLIC BLOOD PRESSURE: 72 MMHG

## 2021-05-27 VITALS
SYSTOLIC BLOOD PRESSURE: 152 MMHG | HEART RATE: 95 BPM | TEMPERATURE: 98 F | DIASTOLIC BLOOD PRESSURE: 70 MMHG | OXYGEN SATURATION: 98 %

## 2021-05-27 VITALS
OXYGEN SATURATION: 96 % | DIASTOLIC BLOOD PRESSURE: 69 MMHG | HEART RATE: 92 BPM | TEMPERATURE: 97.7 F | SYSTOLIC BLOOD PRESSURE: 122 MMHG

## 2021-06-04 VITALS
HEART RATE: 96 BPM | TEMPERATURE: 97.7 F | WEIGHT: 190.2 LBS | OXYGEN SATURATION: 98 % | SYSTOLIC BLOOD PRESSURE: 128 MMHG | BODY MASS INDEX: 28.09 KG/M2 | DIASTOLIC BLOOD PRESSURE: 67 MMHG

## 2021-06-04 VITALS
WEIGHT: 185.7 LBS | SYSTOLIC BLOOD PRESSURE: 126 MMHG | HEART RATE: 93 BPM | OXYGEN SATURATION: 93 % | DIASTOLIC BLOOD PRESSURE: 68 MMHG | TEMPERATURE: 97.9 F | BODY MASS INDEX: 27.42 KG/M2

## 2021-06-04 VITALS
SYSTOLIC BLOOD PRESSURE: 157 MMHG | OXYGEN SATURATION: 96 % | BODY MASS INDEX: 29.9 KG/M2 | WEIGHT: 202.5 LBS | HEART RATE: 95 BPM | TEMPERATURE: 97.6 F | DIASTOLIC BLOOD PRESSURE: 83 MMHG

## 2021-06-04 VITALS
WEIGHT: 160.6 LBS | HEART RATE: 118 BPM | DIASTOLIC BLOOD PRESSURE: 85 MMHG | BODY MASS INDEX: 23.72 KG/M2 | SYSTOLIC BLOOD PRESSURE: 131 MMHG | OXYGEN SATURATION: 99 %

## 2021-06-04 VITALS — BODY MASS INDEX: 32.78 KG/M2 | WEIGHT: 221.3 LBS | HEIGHT: 69 IN

## 2021-06-04 VITALS
WEIGHT: 214 LBS | DIASTOLIC BLOOD PRESSURE: 81 MMHG | TEMPERATURE: 97.7 F | HEART RATE: 104 BPM | BODY MASS INDEX: 31.6 KG/M2 | SYSTOLIC BLOOD PRESSURE: 152 MMHG | OXYGEN SATURATION: 95 %

## 2021-06-04 VITALS
SYSTOLIC BLOOD PRESSURE: 135 MMHG | BODY MASS INDEX: 29.27 KG/M2 | OXYGEN SATURATION: 97 % | DIASTOLIC BLOOD PRESSURE: 76 MMHG | TEMPERATURE: 98.2 F | HEART RATE: 87 BPM | WEIGHT: 198.2 LBS

## 2021-06-04 VITALS
HEART RATE: 95 BPM | DIASTOLIC BLOOD PRESSURE: 81 MMHG | OXYGEN SATURATION: 97 % | WEIGHT: 197.7 LBS | TEMPERATURE: 97.7 F | SYSTOLIC BLOOD PRESSURE: 140 MMHG | BODY MASS INDEX: 29.2 KG/M2

## 2021-06-04 VITALS
TEMPERATURE: 98.1 F | HEART RATE: 91 BPM | OXYGEN SATURATION: 96 % | WEIGHT: 204.5 LBS | DIASTOLIC BLOOD PRESSURE: 71 MMHG | BODY MASS INDEX: 30.2 KG/M2 | SYSTOLIC BLOOD PRESSURE: 126 MMHG

## 2021-06-04 VITALS
TEMPERATURE: 98.4 F | BODY MASS INDEX: 29.55 KG/M2 | OXYGEN SATURATION: 96 % | DIASTOLIC BLOOD PRESSURE: 67 MMHG | SYSTOLIC BLOOD PRESSURE: 128 MMHG | HEART RATE: 85 BPM | WEIGHT: 200.1 LBS

## 2021-06-04 VITALS — WEIGHT: 197 LBS | HEIGHT: 69 IN | BODY MASS INDEX: 29.18 KG/M2

## 2021-06-04 VITALS
DIASTOLIC BLOOD PRESSURE: 76 MMHG | BODY MASS INDEX: 28.37 KG/M2 | TEMPERATURE: 97.7 F | WEIGHT: 192.1 LBS | HEART RATE: 101 BPM | SYSTOLIC BLOOD PRESSURE: 135 MMHG | OXYGEN SATURATION: 95 %

## 2021-06-04 VITALS — BODY MASS INDEX: 28.24 KG/M2 | WEIGHT: 191.2 LBS

## 2021-06-05 VITALS — BODY MASS INDEX: 22.15 KG/M2 | WEIGHT: 150 LBS

## 2021-06-08 NOTE — ANESTHESIA PREPROCEDURE EVALUATION
Anesthesia Evaluation      Patient summary reviewed   No history of anesthetic complications     Airway   Mallampati: I  Neck ROM: full   Pulmonary - normal exam   (+) COPD,                          Cardiovascular - normal exam  Exercise tolerance: > or = 4 METS  (+) hypertension, ,     (-) murmur  Rhythm: regular  Rate: normal,    no murmur      Neuro/Psych - negative ROS     Endo/Other    (+) obesity,      GI/Hepatic/Renal    (+) GERD poorly controlled, esophageal disease,            Dental - normal exam                        Anesthesia Plan  Planned anesthetic: MAC    ASA 3     Anesthetic plan and risks discussed with: patient    Post-op plan: routine recovery

## 2021-06-08 NOTE — ANESTHESIA POSTPROCEDURE EVALUATION
Patient: Jay Oneil  Procedure(s):  ESOPHAGOGASTRODUODENOSCOPY (EGD)  Anesthesia type: MAC    Patient location: PACU  Last vitals: No vitals data found for the desired time range.    Post vital signs: stable  Level of consciousness: awake and responds to simple questions  Post-anesthesia pain: pain controlled  Post-anesthesia nausea and vomiting: no  Pulmonary: unassisted, return to baseline  Cardiovascular: stable and blood pressure at baseline  Hydration: adequate  Anesthetic events: no    QCDR Measures:  ASA# 11 - Bibiana-op Cardiac Arrest: ASA11B - Patient did NOT experience unanticipated cardiac arrest  ASA# 12 - Bibiana-op Mortality Rate: ASA12B - Patient did NOT die  ASA# 13 - PACU Re-Intubation Rate: NA - No ETT / LMA used for case  ASA# 10 - Composite Anes Safety: ASA10A - No serious adverse event    Additional Notes:

## 2021-06-08 NOTE — TELEPHONE ENCOUNTER
Patient's wife is calling again with issues with the jejunostomy tube feedings.  Now the tube site is leaking.  She says that is quite a bit of clear discharge from the insertion site.  Patient is also complaining of significant pain in the abdomen.  Pain is not well controlled.  He feels bloated.    I advised her to bring him to the ER to be evaluated.  I think most likely he will need to be readmitted to the hospital.  I think most likely he is not tolerating the jejunostomy tube feeding.  In that case he will need the feeding to be changed to a gastrostomy feeding.  If he is comfortable with that, then we can ask IR to change the GJ tube to an orderly gastrostomy tube which would be much more easier to handle in the future.    Ideally when he is in the hospital I think we will try to get an MRI of the abdomen also done to try to identify whether there is a primary lesion in the pancreas or biliary tree that can be identified.  This will be helpful to make a decision about the particular chemotherapy regimen to be used for him.    She agreed to bring him into the ER.    I called and notified at the ER.    Denise Jon MD

## 2021-06-08 NOTE — TELEPHONE ENCOUNTER
Received a call from the [facility] home infusion.  They are setting up the patient for tube feeds at home.  Wife is reporting burning abdominal pain.  Also nausea.  Requesting for medications for acid and nausea.    I am sending prescriptions for lansoprazole 15 mg p.o. once daily through the tube and also for prochlorperazine tablets that can be put down the G-tube.    I also asked them to discuss with the patient whether he will be willing to accept G-tube feeding rather than J-tube feeding.  I think he may tolerate it better.  Asked him to work with their nutritionist if he wants a change in the tube feeding.    Denise Jon MD

## 2021-06-08 NOTE — TELEPHONE ENCOUNTER
Received a call from the patient's wife saying that the sublingual morphine is not holding his pain. Has pain in the abdomen and also in the back.      I discussed with her about the variety of ways he may be having pain. It could be due to the cancer in the liver and in the bones. It could also due to the J tube feeds. Unfortunately I cannot prescribe more pain meds without seeing him on the phone.    They are already working with home care to change the tube feeding rate.    If the pain is uncontrolled he will likely need to come back to [facility] ER and be readmitted. We can then work more on pain control and consider changing to G tube feeds instead which may be better tolerated.    She said that if the pain becomes worse they will do so.    Denise Jon MD

## 2021-06-08 NOTE — ANESTHESIA CARE TRANSFER NOTE
Last vitals:   Vitals:    05/27/20 1407   BP: 158/70   Pulse: (!) 52   Resp: 16   Temp: 36.3  C (97.3  F)   SpO2: 97%     Patient's level of consciousness is awake  Spontaneous respirations: yes  Maintains airway independently: yes  Dentition unchanged: yes  Oropharynx: oropharynx clear of all foreign objects    QCDR Measures:  ASA# 20 - Surgical Safety Checklist: WHO surgical safety checklist completed prior to induction    PQRS# 430 - Adult PONV Prevention: 4558F-1P - Medical reason for NOT administering combination therapy  ASA# 8 - Peds PONV Prevention: NA - Not pediatric patient, not GA or 2 or more risk factors NOT present  PQRS# 424 - Bibiana-op Temp Management: 4559F - At least one body temp DOCUMENTED => 35.5C or 95.9F within required timeframe  PQRS# 426 - PACU Transfer Protocol: - Transfer of care checklist used  ASA# 14 - Acute Post-op Pain: ASA14B - Patient did NOT experience pain >= 7 out of 10

## 2021-06-09 NOTE — PROGRESS NOTES
"CLINICAL NUTRITION SERVICES - ASSESSMENT NOTE    Jay Oneil 59 y.o. referred for MNT related to metastatic cancer thought to be of pancreatic origin.    Time Spent: 25 minutes  Visit Type: initial (phone visit)  Referring Physician: Dr. Jon  Pt accompanied by: self    NUTRITION HISTORY  Factors affecting nutrition intake include:dysphagia  Current diet: tube feeding: Isosource 1.5 at 60 mL/ hr (provides: 2160 calories and 98 g protein). He is administering free water flushes throughout the day (approximately 1000 mL based on report). Patient would like to transition to intermittent feedings.  Current appetite/intake: feeding hungry  PEG Tube: GJ tube placed 6/1/2020, using g-port (did not tolerating feedings in the j-port)  Chemotherapy: FOLFOX   Radiation: No         Diet Recall  Margoth is sipping on ice water and taking some broth/ clear liquids but overall oral intake is low.    ANTHROPOMETRICS  Height:   Ht Readings from Last 1 Encounters:   06/10/20 5' 9\" (1.753 m)     Weight:   Wt Readings from Last 1 Encounters:   07/08/20 202 lb 8 oz (91.9 kg)     BMI: 29.90 kg/m2  Weight Status:  Overweight  IBW: 160 lbs (72.7 kg)  % IBW: 126%  Weight History: Weight loss of 13.1 kg (12%) in the past 1 month. Margoth feels his weight is starting to plateau.  Wt Readings from Last 10 Encounters:   07/08/20 202 lb 8 oz (91.9 kg)   06/24/20 214 lb (97.1 kg)   06/12/20 (!) 227 lb 1.6 oz (103 kg)   06/05/20 (!) 231 lb 8 oz (105 kg)   05/30/20 221 lb 4.8 oz (100.4 kg)   03/08/15 (!) 278 lb (126.1 kg)       Dosing Weight: 78 kg (adj for >120% IBW)      Medications/vitamins/minerals/herbals:   Reviewed    Labs:   Labs reviewed  Calcium continues to trend high    Physical Findings:  None    ASSESSED NUTRITION NEEDS:  Estimated Energy Needs: 8922-1626 kcals (25-30 kcal/ kg)  Justification:Maintenance  Estimated Protein Needs:  grams protein (1.2-1.5 g/ kg)  Justification: Increased needs  Estimated Fluid Needs: " 9769-6451 mL (25-30 mL/ kg)   Justification: Maintenance    MALNUTRITION:  % Weight Loss:  >5% in the past 1 month- severe malnutrition  % Intake:  Decreased intake does not meet criteria  Subcutaneous Fat Loss:  Unable to assess  Muscle Loss:  Unable to assess  Fluid Retention:  None noted    Malnutrition Diagnosis: Unable to assess    NUTRITION DIAGNOSIS:  Predicted inadequate nurient intake related to potential for interruptions in feeding (running continuously) as evidenced by anticipated intake less than estimated needs.    INTERVENTIONS  Recommendations / Nutrition Prescription  1. Tube feeding to meet 100% of assessed nutritional needs to support weight maintenance. If weight continues to decline, recommend increase tube feeding to support weight maintenance during treatment.  2. Recommend change tube feeding to the following: Isosource 1.5- 375 mL 4 times/day to provide: 1500 mL, 2250 kcal, 102 g protein, 23 g fiber, and 1140 mL water. Recommend free water of at least 30 mL before and after each feeding. Recommend a total of 1000+ mL free water throughout the day administered via flushes.  Nutrition Education  Provided written & verbal education:   - Reviewed tube feeding and discussed transition to intermittent feedings. Reviewed methods (gravity drip versus syringe feeding). Instructional videos emailed to patient. Also emailed tube feeding instructions.    Pt verbalize understanding of materials provided during consult.   Patient Understanding: Excellent  Expected Compliance: Excellent     Implementation  Collaborate with [facility] RD to coordinate change in tube feeding administration.    Goals  1. Aim for 1950 kcal and 94 g protein/day  2. Weight maintenance through cancer treatment      Follow-Up Plans: Pt has RD contact information for questions.      MONITORING AND EVALUATION:  -Tube feeding  -Fluid/beverage intake  -Weight trends      Gricelda Jansen, MS, RD, LD

## 2021-06-09 NOTE — TELEPHONE ENCOUNTER
"Patient's wife, Barbra, called and left message stating that morphine will run out this weekend. Wondering if can be switched to oxycodone because he has gotten so constipated from morphine. She asked for a call back to [phone] or [phone].    I called Barbra back.  Patient was listening in the background. They were aware Dr. Jon is on vacation this week. I expressed concern about switching pain medications over the weekend, if he was getting good pain relief from the morphine, in case oxycodone wouldn't control his pain.  He is taking miralax daily and did do a fleet enema with \"great\" results on 6/18 and had small BM this morning.  I recommend staying on the morphine now, if morphine is controlling his pain and he is having BMs, as well as continuing to take mirlax daily.  I told him then he could discuss different pain management options when he sees Dr. Jon on 6/25. They agreed with this plan. I told him that I would send this request to one of our providers since Dr. Jon is out of the office until Mon. Barbra asked that refill be sent to [facility] in [place].  Message sent to JODEE Fernando CNP/ISRRAEL Haney RN  "

## 2021-06-09 NOTE — PROGRESS NOTES
[facility] Hematology and Oncology Progress Note    Patient: Jay Oneil  MRN: [id number]  Date of Service: 07/08/2020        Reason for Visit    Follow-up of metastatic cancer thought to be of pancreatic origin.    Assessment and Plan      ECOG Performance   ECOG Performance Status: 2    Distress Assessment  Distress Assessment Score: 3: Please see the discussion below    Pain  Pain Score (Initial OR Reassessment): 5: Please see the discussion below.    Mr. Jay Oneil is a 59 y.o. gentleman who was admitted to the hospital on 5/26/2020 through the ER after he was found to have hypercalcemia with a calcium level 15.7 at [facility] when he had preoperative labs done before a right hip replacement surgery.  He gave a history of increasing acid reflux symptoms, dysphagia and loss of 30 pounds of weight.  He had a past history of osteoarthritis, obstructive sleep apnea, hypertension, hypercholesterolemia, gout, COPD and chronic pain due to spinal degenerative disease.      The CT scan showed thickening of the distal wall of the asparagus, widespread metastatic lesions in the liver, a splenic metastasis, enlarged upper abdominal lymph nodes and multiple bilateral lung mets also.  He had an upper GI endoscopy done with showed extrinsic compression of the distal end of the esophagus but no esophageal mucosal lesion.  This appeared to be a lesion in the wall of the esophagus likely a metastatic site.  Thus this did not appear to be an esophageal cancer.  We obtained a biopsy from a liver metastasis on 5/28/2020 which showed a moderately differentiated adenocarcinoma.  The cells were CK7 positive, CD 19 9 positive and p53 positive.  The pathology impression was that this is metastasis from a pancreaticobiliary primary.  CA-19-9 level was elevated.  He was treated with IV fluids and with Zometa on 5/27/2020 and his calcium level came down nicely.  He was transferred to [facility] to have a  gastrostomy tube placed because of the severe dysphagia.  A GJ tube was placed and an attempt was made to feed him through the jejunostomy for convenience.  Unfortunately that did not agree with him and he had a lot of abdominal cramping, diarrhea etc.  He had to be readmitted to the hospital.  Feeling was changed to be through the G-tube.  He had a Port-A-Cath placed.  He received the first cycle of FOLFOX chemotherapy as an inpatient starting on 06/10/2020.  He was discharged from the hospital after completing the fluorouracil infusion on 6/12/2020.    1.  He is here for cycle #3 of FOLFOX chemotherapy.  The overall impression I have is that he is responding to chemotherapy because on physical examination the enlarged liver appears to be shrinking.  On the other hand I am concerned because he is losing weight and also because his calcium level continues to be elevated.  In any case I think we will have to continue with chemotherapy as that is the only hope to shrinking the cancer.  Labs from today were reviewed.  Blood counts are overall stable with a hemoglobin of 11.8, normal WBC count and platelet count.  Metabolic panel is also adequate for chemotherapy.  FOLFOX cycle #3 dosages were readjusted for the patient's new weight.  We proceeded with cycle #3 of chemotherapy today.  He will come back in 2 days for the pump disconnect.    2.  The patient complains that he had a lot of body aches headache and chills just after the last cycle of chemotherapy.  This appears to have been due to Zometa.  Meanwhile hypercalcemia continues to be a problem.  Calcium today is up to 13.1.  Unfortunately we cannot re-dose of Zometa again 2 weeks later.  We will give him 1 L of normal saline IV bolus in the infusion room today.  I am asking him to push fluids at home also.  Emphasized to him the need to take all the water pressures that is recommended.    3.  I have personally reviewed the images of the MRI of the abdomen from  7/6/2020 and today I reviewed the report with the patient and his wife.  We did not see a specific pancreatic lesion but he does have an enlarged lymph node right on the body of the pancreas.  This may be the primary.  I am not sure otherwise.  He does have multiple liver mets, splenic metastasis and upper abdominal lymph nodes.  I discussed with the patient and his wife that taken with the MRI findings, the pathology findings and the elevated CA-19-9 level, we have to conclude that what he has is a metastatic pancreas cancer.  This is a bit discouraging for him.    4.  He is losing weight.  Unfortunately he is limited in how much he can take in because of the esophageal dysphagia from the mass in the lower end of the esophagus.  I have asked for the nutritionist to contact him.  I would like him to consider what can be done to improve his nutritional intake through the tube.  I wonder whether changing him over to a simple gastrostomy tube will be helpful.  I wonder whether changing him over to bolus feeding through the G-tube will be more effective.  Another thing to consider is whether he is receiving too much of calcium in the tube feeds.  I will await the recommendations from the nutritionist.  If we need to change over the GJ tube to a G-tube, it should be feasible now, 6 weeks after he had the GJ tube inserted.    5.  Cancer related pain: The main pain that he still complains of is in his hips.  This was thought to be due to arthritis from the previous imaging.  We will see whether there are bone mets in the PET CT scan that we are planning after cycle #4 of chemotherapy.  Meanwhile he should continue with the morphine liquid for pain control.  I discussed with him that one thing that he can consider is whether to go for warfarin 10 mg per 5 mL oral solution rather than the concentrate.  Perhaps the larger volume may make it better absorbed and that the pain may be better controlled for him.  I asked him to  consider this and let me know whether he wants to make the change before he needs a next prescription for morphine.  Meanwhile constipation does not appear to be a problem.    6.  Follow-up: Return to clinic with MD or NP in 2 weeks.  Labs according to treatment plan and appointment for cycle #4 of FOLFOX chemotherapy.    Time spend >30 minutes face to face with the patient. More than 50 % in counseling and coordination of care.        Problem List    1. Malignant neoplasm of head of pancreas (H)  CC OFFICE VISIT LONG    Infusion Appointment    CC pump visit (DC pump and flush port)    Ambulatory referral to Nutrition Services    DISCONTINUED: oxaliplatin 180 mg in dextrose 5% 250 mL (ELOXATIN)    DISCONTINUED: leucovorin 850 mg in dextrose 5% 250 mL (WELLCOVORIN)    DISCONTINUED: fluorouraciL 2,400 mg/m2 = 5,000 mg in sodium chloride 0.9% 242 mL (ADRUCIL)    DISCONTINUED: sodium chloride 0.9% 1,000 mL   2. 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 Liver metastases (H)     4. Malignant neoplasm metastatic to both lungs (H)     5. Hypercalcemia of malignancy  DISCONTINUED: sodium chloride 0.9% 1,000 mL   6. Esophageal dysphagia  Ambulatory referral to Nutrition Services   7. Cancer associated pain     8. Encounter for antineoplastic chemotherapy  CC OFFICE VISIT LONG    Infusion Appointment    CC pump visit (DC pump and flush port)    DISCONTINUED: oxaliplatin 180 mg in dextrose 5% 250 mL (ELOXATIN)    DISCONTINUED: leucovorin 850 mg in dextrose 5% 250 mL (WELLCOVORIN)    DISCONTINUED: fluorouraciL 2,400 mg/m2 = 5,000 mg in sodium chloride 0.9% 242 mL (ADRUCIL)        CC: Jose Brewer MD    ______________________________________________________________________________    History of Present Illness    Mr. Jay Oneil is here for a follow-up alone.  His wife also took part of the encounter over the speaker phone.    He states that he continues to have dysphagia due to the esophageal mass.  However there may be some  slight improvement in the dysphagia, especially with liquids.    He continues on tube feeding through the gastric port of the GJ tube.  He says that he is taking Isosource 60 mL/h for 24 hours.  He believes that after the last chemotherapy he was not that good with taking the water flushes.  He states that he feels a bit dehydrated after that.  He states that his stomach feels a bit queasy.  Overall feels a little bit weak today.  He has lost about 12 pounds of weight and he is worried about that.    He continues to have significant hip area pain.    He says that after the last chemotherapy he had a lot of body aches and a feeling of chills and headaches.  This lasted for about 36 hours.  That was very discouraging for him.    He is trying to stick to the plan regarding the morphine concentrate.  He states that he takes 5 mg sublingually every 4 hours for pain as needed.  He is a bit worried about the quantity.  There is some confusion with the last prescription.    He states that at this time constipation is not an issue.    No fevers or night sweats.  No new lumps or bumps anywhere.  No unusual headaches.  No eyesight problems.  No mouth sores.  No difficulty with breathing.  No chest pain or cough.  No blood in stool or black stools.  No difficulty with urination.  No complaints of numbness or tingling that is new.  No skin rashes.    Please see below.  A 14 point review of system is otherwise completely negative.      Pain Status  Currently in Pain: Yes    Review of Systems    Constitutional  Constitutional (WDL): Exceptions to WDL  Fatigue: Fatigue relieved by rest  Fever: None  Chills: None  Weight Gain: None  Weight Loss: 10 - <20% from baseline, nutritional support indicated(12# in two weeks)  Neurosensory  Neurosensory (WDL): Exceptions to WDL  Peripheral Motor Neuropathy: None  Ataxia: Asymptomatic, clinical or diagnostic observations only, intervention not indicated  Peripheral Sensory Neuropathy:  "Asymptomatic, loss of deep tendon reflexes or paresthesia(fingertips to cold)  Confusion: Mild disorientation(\"Foggy\")  Syncope: None  Cardiovascular  Cardiovascular (WDL): Exceptions to WDL  Palpitations: None  Edema: Yes  Edema Trunk: Readily apparent obscuration of anatomic architecture, obliteration of skin folds, readily apparent deviation from normal anatomic contour, limiting instrumental ADL  Phlebitis: None  Superficial thrombophlebitis: None  Pulmonary     Gastrointestinal  Gastrointestinal (WDL): Exceptions to WDL  Constipation: Occasional or intermittent symptoms, occasional use of stool softeners, laxatives, dietary modification, or enema(managed)  Diarrhea: None  Dysphagia: Life-threatening consequences, urgent intervention indicated(stricture, has GJ tube)  Esophagitis: Severely altered eating/swallowing, tube feeding, TPN or hospitalization indicated  Nausea: Loss of appetite without alteration in eating habits(queasy this AM)  Pharyngitis: None  Vomiting: None  Dysgeusia: Altered taste with change in diet (e.g., oral supplements), noxious or unpleasant taste, loss of taste  Dry Mouth: Symptomatic (e.g., dry or thick saliva) without significant dietary alteration, unstimulated saliva flow >0.2 ml/min(occ)  Genitourinary  Genitourinary (WDL): Exceptions to WDL  Urinary Frequency: Present  Urinary Retention: None  Urinary Tract Pain: None  Integumentary  Integumentary (WDL): All integumentary elements are within defined limits  Patient Coping     Distress Assessment  Distress Assessment Score: 3  Accompanied by  Accompanied by: Alone    Past History  Past Medical History:   Diagnosis Date     Chronic pain      COPD (chronic obstructive pulmonary disease) (H)      Gout      Hypercholesterolemia      Hypertension      Low back pain with sciatica      Obstructive sleep apnea 03/23/2008     Osteoarthritis          Past Surgical History:   Procedure Laterality Date     CERVICAL FUSION  07/09/2018    C5-C7 "     COLONOSCOPY  2001     COLONOSCOPY  09/2006     COLONOSCOPY  03/26/2010     COLONOSCOPY  10/23/2015     ESOPHAGOGASTRODUODENOSCOPY  05/27/2020     IR GJ TUBE INSERTION  6/1/2020     IR PORT PLACEMENT >5 YEARS  6/9/2020     LUMBAR LAMINECTOMY  1985, 2002    L4- L5 level.  X2.     AZ ESOPHAGOGASTRODUODENOSCOPY TRANSORAL DIAGNOSTIC N/A 5/27/2020    Procedure: ESOPHAGOGASTRODUODENOSCOPY (EGD);  Surgeon: Yasmany Petit MD;  Location: [facility] OR;  Service: Gastroenterology     SHOULDER ARTHROSCOPY W/ ACROMIAL REPAIR Right 01/14/2016     TONSILLECTOMY  1964       Physical Exam    Recent Vitals 7/8/2020   Height -   Weight 202 lbs 8 oz   BSA (m2) 2.12 m2   /83   Pulse 95   Temp 97.6   Temp src 1   SpO2 96   Some recent data might be hidden       GENERAL: Alert and oriented to time place and person. Seated comfortably. In no distress.  He has lost some weight but he is still obese.    HEAD: Atraumatic and normocephalic.    EYES: CHRISTEN, EOMI.  No pallor.  No icterus.    Oral cavity: no mucosal lesion or tonsillar enlargement.    NECK: supple. JVP normal.  No thyroid enlargement.    LYMPH NODES: No palpable, cervical, axillary or inguinal lymphadenopathy.    CHEST: clear to auscultation bilaterally.  Resonant to percussion throughout bilaterally.  Symmetrical breath movements bilaterally.    Port-A-Cath over the right upper chest looks unremarkable.    CVS: S1 and S2 are heard. Regular rate and rhythm.  No murmur or gallop or rub heard.  No peripheral edema.    ABDOMEN: Soft. Not tender. Not distended.  The GJ tube is in the left upper quadrant.  Insertion site looks clean.  No palpable splenomegaly.  A large firm liver is still palpable with an irregular surface.  I think it may be slightly smaller than what it was earlier.  No other mass palpable.  Bowel sounds heard.    EXTREMITIES: Warm.    SKIN: no rash, or bruising or purpura.  He has male pattern baldness.        Lab Results    Recent Results (from  the past 168 hour(s))   Magnesium   Result Value Ref Range    Magnesium 2.5 1.8 - 2.6 mg/dL   Comprehensive Metabolic Panel   Result Value Ref Range    Sodium 139 136 - 145 mmol/L    Potassium 4.6 3.5 - 5.0 mmol/L    Chloride 106 98 - 107 mmol/L    CO2 27 22 - 31 mmol/L    Anion Gap, Calculation 6 5 - 18 mmol/L    Glucose 121 70 - 125 mg/dL    BUN 13 8 - 22 mg/dL    Creatinine 0.68 (L) 0.70 - 1.30 mg/dL    GFR MDRD Af Amer >60 >60 mL/min/1.73m2    GFR MDRD Non Af Amer >60 >60 mL/min/1.73m2    Bilirubin, Total 0.6 0.0 - 1.0 mg/dL    Calcium 13.1 (HH) 8.5 - 10.5 mg/dL    Protein, Total 7.4 6.0 - 8.0 g/dL    Albumin 3.2 (L) 3.5 - 5.0 g/dL    Alkaline Phosphatase 313 (H) 45 - 120 U/L    AST 41 (H) 0 - 40 U/L    ALT 15 0 - 45 U/L   HM1 (CBC with Diff)   Result Value Ref Range    WBC 6.8 4.0 - 11.0 thou/uL    RBC 4.00 (L) 4.40 - 6.20 mill/uL    Hemoglobin 11.8 (L) 14.0 - 18.0 g/dL    Hematocrit 36.8 (L) 40.0 - 54.0 %    MCV 92 80 - 100 fL    MCH 29.5 27.0 - 34.0 pg    MCHC 32.1 32.0 - 36.0 g/dL    RDW 15.9 (H) 11.0 - 14.5 %    Platelets 141 140 - 440 thou/uL    MPV 10.7 8.5 - 12.5 fL    Neutrophils % 69 50 - 70 %    Lymphocytes % 18 (L) 20 - 40 %    Monocytes % 12 (H) 2 - 10 %    Eosinophils % 0 0 - 6 %    Basophils % 0 0 - 2 %    Neutrophils Absolute 4.7 2.0 - 7.7 thou/uL    Lymphocytes Absolute 1.2 0.8 - 4.4 thou/uL    Monocytes Absolute 0.8 0.0 - 0.9 thou/uL    Eosinophils Absolute 0.0 0.0 - 0.4 thou/uL    Basophils Absolute 0.0 0.0 - 0.2 thou/uL       Imaging    Mr Abdomen With Without Contrast    Result Date: 7/6/2020  EXAM: MR ABDOMEN W WO CONTRAST LOCATION: [facility] DATE/TIME: 7/6/2020 9:57 AM INDICATION: Neoplasm: pancreas Metastatic cancer thought to be of pancreatic primary.  Looking for the primary in the pancreas. COMPARISON: 6/7/20. TECHNIQUE: Routine MRI abdomen protocol including T1 in/out phase, diffusion, multiplane T2, and dynamic T1 with IV contrast. CONTRAST: Gadavist 10ml FINDINGS: The liver  is enlarged. There are numerous peripherally enhancing metastatic lesions throughout the liver which exhibit central low intensity on T1-weighted imaging and hyperintensity on T2-weighted imaging. Normal gallbladder. There is mild left bile duct dilatation. Normal course and caliber of the pancreatic duct. A large gastrohepatic/nora hepatis lymph node abuts the body of the pancreas (series 12 image 72). Otherwise the pancreas is normal in appearance. There are multiple enlarged gastrohepatic, nora hepatis, and retroperitoneal lymph nodes. The spleen is enlarged. There is a 1.3 cm lesion near the upper posterior spleen. There is a small amount of fluid in the upper abdomen. The major vascular structures of the abdomen are normal in caliber and are patent. Multiple simple renal cysts are present. No dedicated follow-up is required. A percutaneous GJ tube is present. Normal adrenal glands. Multiple pulmonary nodules are noted. A T9 hemangioma is likely.     1.  No definite primary pancreatic lesion identified. 2.  There are multiple enlarged abdominal lymph nodes, hepatic lesions, and pulmonary nodules which represent metastatic disease. A lesion in or adjacent to the spleen could represent a splenic lesion or peritoneal spread of disease. 3.  The major vascular structures of the abdomen are normal in caliber and are patent.     Ir Port Placement 5+ Years    Result Date: 6/9/2020  [facility] LOCATION: [facility] DATE: 6/9/2020 PROCEDURES: 1. SUBCUTANEOUS IMPLANTED VENOUS ACCESS PORT. 2. ULTRASOUND GUIDANCE FOR VASCULAR ACCESS. 3. FLUOROSCOPIC GUIDANCE FOR CATHETER PLACEMENT. 4. MODERATE SEDATION INTERVENTIONAL RADIOLOGIST: Eligio Otto MD INDICATION: Patient is a 59-year-old male with a history of metastatic adenocarcinoma. The patient presents to Interventional Radiology for placement of a Port-A-Cath for long-term central venous access to allow for infusion and blood draws. CONSENT: The risks,  benefits and alternatives of Port placement were discussed with the patient  in detail. All questions were answered. Informed consent was given to proceed with the procedure. MODERATE SEDATION: Versed 4 mg IV; Fentanyl 200 mcg IV. During the time out, immediately prior to the administration of medications, the patient was reassessed for adequacy to receive conscious sedation.  Under physician supervision, Versed and fentanyl were administered for moderate sedation. Pulse oximetry, heart rate and blood pressure were continuously monitored by an independent trained observer. The physician spent 45 minutes of face-to-face sedation time with the patient. CONTRAST: None. ANTIBIOTICS: 2 g of IV Ancef. ADDITIONAL MEDICATIONS: None. FLUOROSCOPIC TIME: 1.1 minutes. IMAGES: 1 COMPLICATIONS: No immediate complications. STERILE BARRIER TECHNIQUE: Maximum sterile barrier technique was used. Cutaneous antisepsis was performed at the operative site with application of 2% chlorhexidine and large sterile drape. Prior to the procedure, the  and assistant performed hand hygiene and wore hat, mask, sterile gown, and sterile gloves during the entire procedure. PROCEDURE: After discussing the procedure and risks, informed consent was obtained. Permanent ultrasound images were obtained of the right internal jugular vein, documenting patency and compressibility. The right neck and upper chest were prepped and draped. After local anesthesia, the jugular vein was punctured under direct ultrasound guidance, and a small dilator was placed. A short transverse skin incision was made below the clavicle, and a pocket was created for the port. A skin tunnel was created from the pocket to the vein entry site, and the catheter was pulled through the tunnel. The vein was dilated and the catheter inserted through a peel-away sheath, positioning the tip fluoroscopically at  the SVC/atriocaval junction. The catheter was cut to an appropriate  length. The catheter was then attached to the port itself and the port was placed within the subcutaneous pocket. The port was secured within the pocket utilizing two anchoring sutures.  The port was flushed with heparin. The incision was closed with layered absorbable suture and covered with Dermabond. The patient tolerated the procedure, and there were no immediate complications. FINDINGS: Ultrasound shows an anechoic and compressible jugular vein. A permanent ultrasound image of this was saved. After placement, fluoroscopic spot images show that the tip of the catheter is at the SVC/atriocaval junction.     1.  Uneventful venous access port placement. This port is power injector compatible. CPT codes: 82747, 20773, 78325, 78058, 99153 x 2        Signed by: Denise Jon MD

## 2021-06-09 NOTE — PROGRESS NOTES
[facility] Hematology and Oncology Progress Note    Patient: Jay Oneil  MRN: [id number]  Date of Service: 07/22/2020        Reason for Visit    Follow-up of metastatic cancer thought to be of pancreatic origin.    Assessment and Plan      ECOG Performance   ECOG Performance Status: 2    Distress Assessment   : Please see the discussion below    Pain  Pain Score (Initial OR Reassessment): 6: Please see the discussion below.    Mr. Jay Oneil is a 59 y.o. gentleman who was admitted to the hospital on 5/26/2020 through the ER after he was found to have hypercalcemia with a calcium level 15.7 at [facility] when he had preoperative labs done before a right hip replacement surgery.  He gave a history of increasing acid reflux symptoms, dysphagia and loss of 30 pounds of weight.  He had a past history of osteoarthritis, obstructive sleep apnea, hypertension, hypercholesterolemia, gout, COPD and chronic pain due to spinal degenerative disease.      The CT scan showed thickening of the distal wall of the asparagus, widespread metastatic lesions in the liver, a splenic metastasis, enlarged upper abdominal lymph nodes and multiple bilateral lung mets also.  He had an upper GI endoscopy done with showed extrinsic compression of the distal end of the esophagus but no esophageal mucosal lesion.  This appeared to be a lesion in the wall of the esophagus likely a metastatic site.  Thus this did not appear to be an esophageal cancer.  We obtained a biopsy from a liver metastasis on 5/28/2020 which showed a moderately differentiated adenocarcinoma.  The cells were CK7 positive, CD 19 9 positive and p53 positive.  The pathology impression was that this is metastasis from a pancreaticobiliary primary.  CA-19-9 level was elevated.  He was treated with IV fluids and with Zometa on 5/27/2020 and his calcium level came down nicely.  He was transferred to [facility] to have a gastrostomy tube placed  because of the severe dysphagia.  A GJ tube was placed and an attempt was made to feed him through the jejunostomy for convenience.  Unfortunately that did not agree with him and he had a lot of abdominal cramping, diarrhea etc.  He had to be readmitted to the hospital.  Feeling was changed to be through the G-tube.  He had a Port-A-Cath placed.  He received the first cycle of FOLFOX chemotherapy as an inpatient starting on 06/10/2020.  He was discharged from the hospital after completing the fluorouracil infusion on 6/12/2020.    1.  He is here for cycle #4 of FOLFOX chemotherapy.  I am hoping that he is responding to the chemotherapy because I think the enlarged liver is shrinking a bit.  He has tolerated chemotherapy fairly well so far and he feels ready for cycle #4.  Labs from today were reviewed.  Hemoglobin is slightly lower than earlier but is adequate for chemotherapy.  WBC count and platelet count are normal.  Metabolic panel is also adequate for chemotherapy.  We will proceed with cycle #4 of FOLFOX chemotherapy today.    2.  He again has hypercalcemia in his metabolic panel.  This is hypercalcemia of malignancy.  Slightly higher than 2 weeks ago at 13.5.  Alkaline phosphatase is also higher.  This is the one thing that makes me doubt whether the chemotherapy is working or not.  He is due for Zometa today for this which will be given to him.  I will give him additional 1 L normal saline bolus also in the infusion room along with the chemotherapy.    3.  I discussed with him that it is very important that he push fluids at home.  Anything other than milk or other drinks which is got extra calcium.  He can take plain water, Gatorade lemonade, pop etc.  He should take extra flushes through the G-tube.  He can also drink through his mouth water he can tolerate.  I pointed out to him that is why I get is a good idea for him to go with the nutritionist advice to go for bolus feeding.  Even though it may be some  extra work, I think it would be more beneficial for him.  He can take more fluid boluses in between.  I also pointed out to him that this is going to give him some free time in between the tube feedings.  He has promised to give that a try.    4.  I discussed with him that it is important to follow-up and see whether he is responding to the chemotherapy.  He has bilateral lung mets, liver mets, splenic mets, the metastatic mass at the lower end of the esophagus and a possible primary in the pancreas.  We need to follow-up on all these.  Thus I am recommending a PET CT scan at the end of this cycle.  He was agreeable to the plan.    5.  For cancer related pain, he is satisfied with the current pain regimen which he thinks is giving him adequate pain control.  He is on morphine liquid 10 mg per 5 mL, taking 5 mL every 4 hours as needed.  He is to call me when he needs a new prescription.    6.  He does have some opioid-induced constipation.  He is successful in having a bowel movement every day.  I have advised him to take milk of magnesia as needed which he thinks is working well.    7.  Follow-up: Return to clinic with me preferably in 2 weeks with labs according to treatment plan and appointment for the next cycle of chemotherapy.  I have ordered a PET CT scan which is to be scheduled a couple of days before that appointment.    Time spend >25 minutes face to face with the patient. More than 50 % in counseling and coordination of care.      Problem List    1. Malignant neoplasm of head of pancreas (H)  CC OFFICE VISIT LONG    Infusion Appointment    CC pump visit (DC pump and flush port)    NM PET CT Skull to Mid Thigh   2. Encounter for antineoplastic chemotherapy  CC OFFICE VISIT LONG    Infusion Appointment    CC pump visit (DC pump and flush port)   3. Liver metastases (H)  NM PET CT Skull to Mid Thigh   4. Malignant neoplasm metastatic to both lungs (H)  NM PET CT Skull to Mid Thigh   5. 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 Hypercalcemia of malignancy  DISCONTINUED: sodium chloride 0.9% 1,000 mL   7. Esophageal dysphagia     8. Cancer associated pain          CC: Jose Brewer MD    ______________________________________________________________________________    History of Present Illness    Mr. Jay Oneil is here for follow-up alone.    He feels that he tolerated cycle #3 of chemotherapy fairly well.  He has a slight feeling of plugging of the right ear.  Overall pain control he says is under better control.  He is satisfied with the current pain regimen.  Currently pain is in bilateral hips and also some radiating pain down the left leg from the back.  He states that off-and-on he also has some mild pain inside his abdomen.  He feels that the liquid morphine is better than the morphine concentrate.  He will call when he needs a new prescription from me.    He says that he has a bowel movement every day.  Sometimes he has to strain a little bit for the bowel movement because of the morphine.  He has been using milk of magnesia on an as-needed basis and he feels that is working quite well.    He did talk to the nutritionist and he states that he tried the bolus tube feeding for 2 days.  The feeding went well but he felt that it was too much work.  So he is back on continuous feeding.  He has not lost anymore weight.    No fevers or night sweats.  No lumps or bumps anywhere.  No unusual headaches.  No eyesight problems.  No mouth sores.  Still has some difficulty with swallowing but he says that it may be slightly better.  Breathing is okay.  No chest pain or cough.  No difficulty with urination.  No skin rashes.  He has some numbness and tingling in his fingers and toes for a couple of days after chemotherapy but after that that goes away.  No skin rashes.    Please see below.  A 14 point review of system is otherwise completely negative.    Pain Status  Currently in Pain:  "Yes    Review of Systems    Constitutional  Constitutional (WDL): Exceptions to WDL  Fatigue: Fatigue relieved by rest  Fever: None  Chills: None  Weight Gain: 5 - <10% from baseline(2#)  Weight Loss: None  Neurosensory  Neurosensory (WDL): Exceptions to WDL  Peripheral Motor Neuropathy: None  Ataxia: Asymptomatic, clinical or diagnostic observations only, intervention not indicated  Peripheral Sensory Neuropathy: Asymptomatic, loss of deep tendon reflexes or paresthesia(leg/feet)  Confusion: Mild disorientation(\"foggy\")  Syncope: None  Cardiovascular  Cardiovascular (WDL): Exceptions to WDL  Palpitations: None  Edema: Yes  Edema Trunk: Readily apparent obscuration of anatomic architecture, obliteration of skin folds, readily apparent deviation from normal anatomic contour, limiting instrumental ADL  Phlebitis: None  Superficial thrombophlebitis: None  Pulmonary  Respiratory (WDL): Exceptions to WDL  Cough: None  Dyspnea: Shortness of breath with moderate exertion  Hypoxia: None  Gastrointestinal  Gastrointestinal (WDL): Exceptions to WDL  Constipation: Occasional or intermittent symptoms, occasional use of stool softeners, laxatives, dietary modification, or enema(using milk of mag)  Diarrhea: None  Dysphagia: Life-threatening consequences, urgent intervention indicated  Esophagitis: Severely altered eating/swallowing, tube feeding, TPN or hospitalization indicated  Nausea: Loss of appetite without alteration in eating habits(if food doesn't pass)  Pharyngitis: None  Vomiting: None  Dysgeusia: Altered taste with change in diet (e.g., oral supplements), noxious or unpleasant taste, loss of taste  Dry Mouth: Symptomatic (e.g., dry or thick saliva) without significant dietary alteration, unstimulated saliva flow >0.2 ml/min  Genitourinary  Genitourinary (WDL): Exceptions to WDL(urgency, some incontinence)  Urinary Frequency: Present(2-3 times per night)  Urinary Retention: Urinary, suprapubic or intermittent catheter " placement not indicated, able to void with some residual  Urinary Tract Pain: None  Integumentary  Integumentary (WDL): (S) Exceptions to WDL(dry hands)  Patient Coping  Patient Coping: Open/discussion  Distress Assessment     Accompanied by  Accompanied by: Alone    Past History  Past Medical History:   Diagnosis Date     Chronic pain      COPD (chronic obstructive pulmonary disease) (H)      Gout      Hypercholesterolemia      Hypertension      Low back pain with sciatica      Obstructive sleep apnea 03/23/2008     Osteoarthritis          Past Surgical History:   Procedure Laterality Date     CERVICAL FUSION  07/09/2018    C5-C7     COLONOSCOPY  2001     COLONOSCOPY  09/2006     COLONOSCOPY  03/26/2010     COLONOSCOPY  10/23/2015     ESOPHAGOGASTRODUODENOSCOPY  05/27/2020     IR GJ TUBE INSERTION  6/1/2020     IR PORT PLACEMENT >5 YEARS  6/9/2020     LUMBAR LAMINECTOMY  1985, 2002    L4- L5 level.  X2.     VT ESOPHAGOGASTRODUODENOSCOPY TRANSORAL DIAGNOSTIC N/A 5/27/2020    Procedure: ESOPHAGOGASTRODUODENOSCOPY (EGD);  Surgeon: Yasmany Petit MD;  Location: [facility];  Service: Gastroenterology     SHOULDER ARTHROSCOPY W/ ACROMIAL REPAIR Right 01/14/2016     TONSILLECTOMY  1964       Physical Exam    Recent Vitals 7/22/2020   Weight 204 lbs 8 oz   BSA (m2) 2.13 m2   /71   Pulse 91   Temp 98.1   Temp src 1   SpO2 96   Some recent data might be hidden       GENERAL: Alert and oriented to time place and person. Seated comfortably. In no distress.  Still heavyset.  He does not appear to have lost more weight.  Somewhat more comfortable today compared to 2 weeks ago.  He walks slowly because of bilateral hip pain.    HEAD: Atraumatic and normocephalic.    EYES: CHRISTEN, EOMI.  No pallor.  No icterus.    Oral cavity: no mucosal lesion or tonsillar enlargement.    NECK: supple. JVP normal.  No thyroid enlargement.    LYMPH NODES: No palpable, cervical, axillary or inguinal lymphadenopathy.    CHEST:  clear to auscultation bilaterally.  Resonant to percussion throughout bilaterally.  Symmetrical breath movements bilaterally.  Port-A-Cath over the right upper chest looks unremarkable.    CVS: S1 and S2 are heard. Regular rate and rhythm.  No murmur or gallop or rub heard.  No peripheral edema.    ABDOMEN: Soft. Not tender.  Obese versus distended.  The spleen is not palpable now.  A firm liver is palpable 8 cm below the costal margin at the midclavicular line.  I think this is slightly smaller than earlier.  The GJ tube is present in the left upper quadrant.  Insertion site looks healthy.  No other mass palpable.  Bowel sounds heard.    EXTREMITIES: Warm.    SKIN: no rash, or bruising or purpura.  Male pattern baldness.        Lab Results    Recent Results (from the past 168 hour(s))   Magnesium   Result Value Ref Range    Magnesium 2.4 1.8 - 2.6 mg/dL   Comprehensive Metabolic Panel   Result Value Ref Range    Sodium 136 136 - 145 mmol/L    Potassium 4.6 3.5 - 5.0 mmol/L    Chloride 103 98 - 107 mmol/L    CO2 26 22 - 31 mmol/L    Anion Gap, Calculation 7 5 - 18 mmol/L    Glucose 126 (H) 70 - 125 mg/dL    BUN 16 8 - 22 mg/dL    Creatinine 0.67 (L) 0.70 - 1.30 mg/dL    GFR MDRD Af Amer >60 >60 mL/min/1.73m2    GFR MDRD Non Af Amer >60 >60 mL/min/1.73m2    Bilirubin, Total 0.6 0.0 - 1.0 mg/dL    Calcium 13.5 (HH) 8.5 - 10.5 mg/dL    Protein, Total 7.1 6.0 - 8.0 g/dL    Albumin 3.0 (L) 3.5 - 5.0 g/dL    Alkaline Phosphatase 331 (H) 45 - 120 U/L    AST 40 0 - 40 U/L    ALT 13 0 - 45 U/L   HM1 (CBC with Diff)   Result Value Ref Range    WBC 8.2 4.0 - 11.0 thou/uL    RBC 3.71 (L) 4.40 - 6.20 mill/uL    Hemoglobin 10.8 (L) 14.0 - 18.0 g/dL    Hematocrit 33.6 (L) 40.0 - 54.0 %    MCV 91 80 - 100 fL    MCH 29.1 27.0 - 34.0 pg    MCHC 32.1 32.0 - 36.0 g/dL    RDW 16.2 (H) 11.0 - 14.5 %    Platelets 102 (L) 140 - 440 thou/uL    MPV 11.4 8.5 - 12.5 fL    Neutrophils % 73 (H) 50 - 70 %    Lymphocytes % 17 (L) 20 - 40 %     Monocytes % 10 2 - 10 %    Eosinophils % 0 0 - 6 %    Basophils % 0 0 - 2 %    Neutrophils Absolute 5.9 2.0 - 7.7 thou/uL    Lymphocytes Absolute 1.4 0.8 - 4.4 thou/uL    Monocytes Absolute 0.8 0.0 - 0.9 thou/uL    Eosinophils Absolute 0.0 0.0 - 0.4 thou/uL    Basophils Absolute 0.0 0.0 - 0.2 thou/uL       Imaging    Mr Abdomen With Without Contrast    Result Date: 7/6/2020  EXAM: MR ABDOMEN W WO CONTRAST LOCATION: [facility] DATE/TIME: 7/6/2020 9:57 AM INDICATION: Neoplasm: pancreas Metastatic cancer thought to be of pancreatic primary.  Looking for the primary in the pancreas. COMPARISON: 6/7/20. TECHNIQUE: Routine MRI abdomen protocol including T1 in/out phase, diffusion, multiplane T2, and dynamic T1 with IV contrast. CONTRAST: Gadavist 10ml FINDINGS: The liver is enlarged. There are numerous peripherally enhancing metastatic lesions throughout the liver which exhibit central low intensity on T1-weighted imaging and hyperintensity on T2-weighted imaging. Normal gallbladder. There is mild left bile duct dilatation. Normal course and caliber of the pancreatic duct. A large gastrohepatic/nora hepatis lymph node abuts the body of the pancreas (series 12 image 72). Otherwise the pancreas is normal in appearance. There are multiple enlarged gastrohepatic, nora hepatis, and retroperitoneal lymph nodes. The spleen is enlarged. There is a 1.3 cm lesion near the upper posterior spleen. There is a small amount of fluid in the upper abdomen. The major vascular structures of the abdomen are normal in caliber and are patent. Multiple simple renal cysts are present. No dedicated follow-up is required. A percutaneous GJ tube is present. Normal adrenal glands. Multiple pulmonary nodules are noted. A T9 hemangioma is likely.     1.  No definite primary pancreatic lesion identified. 2.  There are multiple enlarged abdominal lymph nodes, hepatic lesions, and pulmonary nodules which represent metastatic disease. A lesion  in or adjacent to the spleen could represent a splenic lesion or peritoneal spread of disease. 3.  The major vascular structures of the abdomen are normal in caliber and are patent.         Signed by: Denise Jon MD

## 2021-06-09 NOTE — TELEPHONE ENCOUNTER
Per pharmacist Keith at [facility], patient has been using up to 10 doses daily. Needs OK to dispense now as it is 9 day early.   Per Dr. Jon, OK to dispense with education that patient is not to use more than 0.25 mg six times in 24 hours.   Pharmacist will educate. I also called and spoke with spouse, gave same information. Patient needs to call us directly if pain control is inadequate. Respiratory depression risk is increased with greater doses.  Spouse agrees with plan.   Barbra Tinoco RN

## 2021-06-09 NOTE — PROGRESS NOTES
Pt amb into clinic for chemo infusion. Chemo education given chairside throughout infusion. Reviewed plan of care. Pt premedicated. Pt tolerated infusions very well. Reviewed CADD pump dc instructions with pt. Reviewed dc instructions including whom and when to call. Pt denies having any questions. Report given to Felicia BERMAN

## 2021-06-09 NOTE — PATIENT INSTRUCTIONS - HE
I strongly recommend that you go to bolus tube feeding as advised by the nutritionist.    Recommend that you try to take as much extra fluid as possible through the stomach tube and also orally.  This is very important to control the calcium level.

## 2021-06-09 NOTE — PROGRESS NOTES
Patient is here for two week follow-up of cancer.   Due D1C3 FOLFOX, Zometa. Had MR abd.  Barbra Tinoco RN

## 2021-06-09 NOTE — TELEPHONE ENCOUNTER
Navigator became aware of this patient and reached out to him to introduce self and offer support. He describes his meeting with Gricelda Jansen, dietician, and that he is rethinking going to bolus tube feedings as he feels it is more trouble than he does now with the continuous drip. He will speak with the infusion services and Gricelda about this.

## 2021-06-09 NOTE — TELEPHONE ENCOUNTER
Talha from [facility] called to update us on pt G Tube. Pt had co drainage from site. Talha reports pt only had a small amt of drainage on 2x2 , no pain or redness. Vss, afebrile. Pt and his wife were taught how to care for G tube and taught to call for fever, increased drainage or pain. Pt will be returning tomorrow to our infusion center for chemo pump discont. Callie Gilmore RN

## 2021-06-09 NOTE — TELEPHONE ENCOUNTER
Patient's wife, Barbra, left a message requesting refill of morphine so it can be picked up over the weekend. She asked for refill to be sent to [facility]. She can be reached at [phone] and patient can be reached at [phone].    Morphine last refilled on 6/30/20 by Dr. Jon. Quantity #30 ml. No refills. Message sent to Dr. Jon/NORMA Newman Dr. asked that I call patient/wife to see if he'd like to stay on morphine concentrate or switch to regular morphine liquid. Barbra called. She asked patient is his preference and he would like to try the non-concentrate form of morphine to see if it helps is pain better. I told her Dr. Jon would send this refill request to their pharmacy today. She verbalized understanding and thanked me for calling back. Dr. Jon updated/ISRRAEL Haney RN

## 2021-06-09 NOTE — PROGRESS NOTES
Patient presented for chemotherapy. Port accessed and labs sent in clinic. Premedicated with IV zofran and decadron. Oxaliplatin and leucovorin infused - tolerated well. 5FU pump set up and infusing at 5ml./hr. Left clinic independently. To follow-up Friday for pump d/c.

## 2021-06-09 NOTE — PROGRESS NOTES
[facility] Hematology and Oncology Progress Note    Patient: Jay Oneil  MRN: [id number]  Date of Service: 06/24/2020        Reason for Visit    Follow-up of metastatic cancer thought to be of pancreatic origin.    Assessment and Plan      ECOG Performance   ECOG Performance Status: 2    Distress Assessment  Distress Assessment Score: 2: Please see the discussion below    Pain  Pain Score (Initial OR Reassessment): 6: Please see the discussion below.    Mr. Jay Oneil is a 59 y.o. gentleman who was admitted to the hospital on 5/26/2020 through the ER after he was found to have hypercalcemia with a calcium level 15.7 at [facility] when he had preoperative labs done before a right hip replacement surgery.  He gave a history of increasing acid reflux symptoms, dysphagia and loss of 30 pounds of weight.  He had a past history of osteoarthritis, obstructive sleep apnea, hypertension, hypercholesterolemia, gout, COPD and chronic pain due to spinal degenerative disease.      The CT scan showed thickening of the distal wall of the asparagus, widespread metastatic lesions in the liver, a splenic metastasis, enlarged upper abdominal lymph nodes and multiple bilateral lung mets also.  He had an upper GI endoscopy done with showed extrinsic compression of the distal end of the esophagus but no esophageal mucosal lesion.  This appeared to be a lesion in the wall of the esophagus likely a metastatic site.  Thus this did not appear to be an esophageal cancer.  We obtained a biopsy from a liver metastasis on 5/28/2020 which showed a moderately differentiated adenocarcinoma.  The cells were CK7 positive, CD 19 9 positive and p53 positive.  The pathology impression was that this is metastasis from a pancreaticobiliary primary.  CA-19-9 level was elevated.  He was treated with IV fluids and with Zometa on 5/27/2020 and his calcium level came down nicely.  He was transferred to [facility] to have a  gastrostomy tube placed because of the severe dysphagia.  A GJ tube was placed and an attempt was made to feed him through the jejunostomy for convenience.  Unfortunately that did not agree with him and he had a lot of abdominal cramping, diarrhea etc.  He had to be readmitted to the hospital.  Feeling was changed to be through the G-tube.  He had a Port-A-Cath placed.  He received the first cycle of FOLFOX chemotherapy as an inpatient starting on 06/10/2020.  He was discharged from the hospital after completing the fluorouracil infusion on 6/12/2020.    1.  Overall he appears to have tolerated the first cycle of FOLFOX chemotherapy fairly well.  He feels ready to proceed to cycle #2 of chemotherapy.  I think he is already responding to chemotherapy because the abdominal distention and pain has come down.  I think the ascites is decreased.  Labs from today were reviewed.  Her counts are fairly stable with a hemoglobin of 11.4, normal white blood cell count and platelet count.  Metabolic panel is also stable except for the fact that his calcium is again increased to 12.3.  Alkaline phosphatase is still somewhat elevated.  AST and ALT are better.  We will proceed with cycle #2 of FOLFOX chemotherapy today.  Dosages were readjusted based on his new weight.  He was given chemotherapy education today.  I explained to him of the finer points about how FOLFOX chemotherapy is given.  I have also given him.  Information about the FOLFOX regimen to read from the [facility] cancer support website.    2.  I discussed with him that we will plan to give him 4 cycles of FOLFOX chemotherapy.  After that I think we will obtain a PET CT scan.  This will help us to delineate other sites of disease.  Maybe it will show us other sites of bone mets which may need to be treated.  He was agreeable to the plan.    3.  He has recurrence of hypercalcemia.  It is now 4 weeks since the last Zometa infusion.  We will give him Zometa again today.   That should take care of the hypercalcemia.  I discussed with him about his dental health.  He states that he has had regular dental appointments and his dental health is in good shape.  I discussed with him that if he starts having any dental issues he should let us know so that he can be treated promptly.  I explained to him about the risk for osteonecrosis of the jaw.  He voiced understanding.    4.  I discussed with him about obtaining an MRI of the abdomen to try to exactly delineate where the primary in the pancreas is.  He was agreeable to the plan.  I have ordered MRI of the abdomen with and without contrast.  I have asked for this to be scheduled within the next 2 weeks before the next appointment.    5.  Still has significant esophageal dysphagia.  Thus he will need to continue with tube feeds.  Now that the tube feeds are going in through the gastrostomy he is tolerating it much better.  I asked him to continue with the current tube feeding plan with Isosource at 60 mL/h.  I think this should give him enough calories.  Down the line I think with nutrition support he can consider change over to intermittent feeding.  Once it is 6 weeks out from the GJ tube insertion, we can also consider changing out the GJ tube for an ordinary gastrostomy tube which should make management easier.  He was agreeable to the plan.    6.  Cancer associated pain: At this time he is on morphine concentrate 5 mg liquid sublingually every 4 hours as needed.  He is satisfied with the current pain control.  At this time the pain appears to be mostly from the hip arthritis.  We will of course need to find out in the next scan whether there is any bone mets that is causing this pain.    7.  He has constipation.  I think this is mainly opioid induced constipation but there may be contribution from the hypercalcemia also.  This should get better to some extent as the calcium level comes down.  I advised him that we need to stay ahead of  the pain.  He should aim for 1 bowel movement daily.  I asked him to take a liquid senna 1 dose every day.  After that if he does not have a bowel movement he should take milk of magnesia as needed until he has a bowel movement.  I am hoping that this will preclude him having to take Fleet enemas to get his bowels moving.    8.  Follow-up: Return to clinic with MD or NP in 2 weeks with labs according to treatment plan and appointment for cycle #3 of FOLFOX chemotherapy.    Time spend >40 minutes face to face with the patient. More than 50 % in counseling and coordination of care.          Problem List    1. Malignant neoplasm of head of pancreas (H)  CC OFFICE VISIT LONG    MR Abdomen With Without Contrast    Infusion Appointment    CC pump visit (DC pump and flush port)    sennosides (SENNOSIDES) 8.8 mg/5 mL Syrp syrup    magnesium hydroxide (MAGNESIUM HYDROXIDE) 400 mg/5 mL Susp suspension    DISCONTINUED: zoledronic acid 4 mg in sodium chloride 0.9% 100 mL (ZOMETA)    DISCONTINUED: oxaliplatin 180 mg in dextrose 5% 250 mL (ELOXATIN)    DISCONTINUED: leucovorin 850 mg in dextrose 5% 250 mL (WELLCOVORIN)    DISCONTINUED: fluorouraciL 2,400 mg/m2 = 5,200 mg in sodium chloride 0.9% 242 mL (ADRUCIL)   2. Encounter for antineoplastic chemotherapy  CC OFFICE VISIT LONG    Infusion Appointment    CC pump visit (DC pump and flush port)    DISCONTINUED: oxaliplatin 180 mg in dextrose 5% 250 mL (ELOXATIN)    DISCONTINUED: leucovorin 850 mg in dextrose 5% 250 mL (WELLCOVORIN)    DISCONTINUED: fluorouraciL 2,400 mg/m2 = 5,200 mg in sodium chloride 0.9% 242 mL (ADRUCIL)   3. Liver metastases (H)  MR Abdomen With Without Contrast   4. Malignant neoplasm metastatic to both lungs (H)     5. 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 Hypercalcemia of malignancy  DISCONTINUED: zoledronic acid 4 mg in sodium chloride 0.9% 100 mL (ZOMETA)   7. Esophageal dysphagia     8. Constipation due to opioid therapy  sennosides (SENNOSIDES) 8.8 mg/5 mL  Syrp syrup    magnesium hydroxide (MAGNESIUM HYDROXIDE) 400 mg/5 mL Susp suspension   9. Cancer associated pain          CC: Jose Brewer MD    ______________________________________________________________________________    History of Present Illness    Mr. Jay Oneil is here for follow-up alone.  His wife was also taking part in the encounter over the speaker phone.    He says that he did fairly well after discharge from the hospital completing the first cycle of FOLFOX chemotherapy.  Pain is under much better control.  He states that currently the pain that he has is mostly in his left hip and upper left leg due to the arthritis.  He is happy with the current pain medications.    The abdominal pain has mostly subsided but he still feels quite bloated.  The G-tube feeding is going fairly well.  He does not have much problem with nausea or gas.    The G-tube insertion site is no longer leaking.  He says that the leakage stopped about 2 days after being discharged from the hospital.  He has noticed that he has lost 13 pounds of weight.  It could be that this is due to reduction in the ascites.    However he still has a problem with ongoing constipation.  He has been treated with several Fleet enemas.  He has been using MiraLAX regularly but that does not appear to be doing much for him.    He says that urination is okay even though he does have some obstructive symptoms and hesitancy.    He states that when he reached home and had some ice in his mouth he could really feel the neuropathy.  Apart from that he has not had any numbness and tingling in his fingers and toes.    He has been able to eat some soft foods in the last couple of days.    No fevers or night sweats.  No lumps or bumps anywhere.  No unusual headaches.  No eyesight problems.  No mouth sores.  No shortness of breath.  No chest pain or palpitation.  No cough.  No skin rashes.    Please see below.  A 14 point review of system is  "otherwise completely negative.        Pain Status  Currently in Pain: Yes    Review of Systems    Constitutional  Constitutional (WDL): Exceptions to WDL  Fatigue: Fatigue relieved by rest  Fever: None  Chills: None  Weight Gain: None  Neurosensory  Neurosensory (WDL): Exceptions to WDL  Peripheral Motor Neuropathy: None  Ataxia: Asymptomatic, clinical or diagnostic observations only, intervention not indicated(d/t Rt hip DJD)  Peripheral Sensory Neuropathy: Asymptomatic, loss of deep tendon reflexes or paresthesia(cold sensitivity in mouth for few while on pump)  Confusion: Mild disorientation(\"foggy\")  Syncope: None  Cardiovascular  Cardiovascular (WDL): Exceptions to WDL  Palpitations: None  Edema: Yes  Edema Trunk: Readily apparent obscuration of anatomic architecture, obliteration of skin folds, readily apparent deviation from normal anatomic contour, limiting instrumental ADL  Phlebitis: None  Superficial thrombophlebitis: None  Pulmonary  Respiratory (WDL): Exceptions to WDL  Cough: None  Dyspnea: Shortness of breath with moderate exertion(paces self)  Hypoxia: None  Gastrointestinal  Anorexia: None(has GJ tube)  Constipation: Occasional or intermittent symptoms, occasional use of stool softeners, laxatives, dietary modification, or enema  Diarrhea: None  Dysphagia: Life-threatening consequences, urgent intervention indicated(esophageal stricturem has GJ tube)  Esophagitis: Severely altered eating/swallowing, tube feeding, TPN or hospitalization indicated  Nausea: None  Pharyngitis: None  Vomiting: None  Dysgeusia: Altered taste with change in diet (e.g., oral supplements), noxious or unpleasant taste, loss of taste  Dry Mouth: Symptomatic (e.g., dry or thick saliva) without significant dietary alteration, unstimulated saliva flow >0.2 ml/min  Genitourinary  Genitourinary (WDL): Exceptions to WDL  Urinary Frequency: Present(nocturnal)  Urinary Retention: None  Urinary Tract Pain: " None  Integumentary  Integumentary (WDL): Exceptions to WDL(healing GJ tube site, dry skin)  Alopecia: None  Rash Maculo-Papular: None  Pruritus: None  Urticaria: None  Palmar-Plantar Erythrodysesthesia Syndrome: None  Flushing: None  Patient Coping  Patient Coping: Open/discussion  Distress Assessment  Distress Assessment Score: 2  Accompanied by       Past History  Past Medical History:   Diagnosis Date     Chronic pain      COPD (chronic obstructive pulmonary disease) (H)      Gout      Hypercholesterolemia      Hypertension      Low back pain with sciatica      Obstructive sleep apnea 03/23/2008     Osteoarthritis          Past Surgical History:   Procedure Laterality Date     CERVICAL FUSION  07/09/2018    C5-C7     COLONOSCOPY  2001     COLONOSCOPY  09/2006     COLONOSCOPY  03/26/2010     COLONOSCOPY  10/23/2015     ESOPHAGOGASTRODUODENOSCOPY  05/27/2020     IR GJ TUBE INSERTION  6/1/2020     IR PORT PLACEMENT >5 YEARS  6/9/2020     LUMBAR LAMINECTOMY  1985, 2002    L4- L5 level.  X2.     WA ESOPHAGOGASTRODUODENOSCOPY TRANSORAL DIAGNOSTIC N/A 5/27/2020    Procedure: ESOPHAGOGASTRODUODENOSCOPY (EGD);  Surgeon: Yasmany Petit MD;  Location: [facility];  Service: Gastroenterology     SHOULDER ARTHROSCOPY W/ ACROMIAL REPAIR Right 01/14/2016     TONSILLECTOMY  1964       Physical Exam    Recent Vitals 6/24/2020   Height -   Weight 214 lbs   BSA (m2) 2.17 m2   /81   Pulse 104   Temp 97.7   Temp src 1   SpO2 95   Some recent data might be hidden       GENERAL: Alert and oriented to time place and person. Seated comfortably. In no distress.  Large build.  He actually looks more comfortable than when I last saw him in the hospital.    HEAD: Atraumatic and normocephalic.    EYES: CHRISTEN, EOMI.  No pallor.  No icterus.    Oral cavity: no mucosal lesion or tonsillar enlargement.    NECK: supple. JVP normal.  No thyroid enlargement.    LYMPH NODES: No palpable, cervical, axillary or inguinal  lymphadenopathy.    CHEST: clear to auscultation bilaterally.  Resonant to percussion throughout bilaterally.  Symmetrical breath movements bilaterally.    Port-A-Cath over the right upper chest looks unremarkable.    CVS: S1 and S2 are heard. Regular rate and rhythm.  No murmur or gallop or rub heard.  No peripheral edema.    ABDOMEN: Soft. Not tender.  Somewhat distended/obese.  The GJ tube is inserted in the left upper quadrant.  Insertion site looks healthy.  I think I can palpate a firm liver.  The spleen is not palpable.  No other mass palpable.  Bowel sounds heard.    EXTREMITIES: Warm.    SKIN: no rash, or bruising or purpura.  Male pattern baldness.      Lab Results    Recent Results (from the past 168 hour(s))   Magnesium   Result Value Ref Range    Magnesium 2.1 1.8 - 2.6 mg/dL   Comprehensive Metabolic Panel   Result Value Ref Range    Sodium 137 136 - 145 mmol/L    Potassium 4.8 3.5 - 5.0 mmol/L    Chloride 104 98 - 107 mmol/L    CO2 25 22 - 31 mmol/L    Anion Gap, Calculation 8 5 - 18 mmol/L    Glucose 118 70 - 125 mg/dL    BUN 13 8 - 22 mg/dL    Creatinine 0.64 (L) 0.70 - 1.30 mg/dL    GFR MDRD Af Amer >60 >60 mL/min/1.73m2    GFR MDRD Non Af Amer >60 >60 mL/min/1.73m2    Bilirubin, Total 0.9 0.0 - 1.0 mg/dL    Calcium 12.3 (HH) 8.5 - 10.5 mg/dL    Protein, Total 7.0 6.0 - 8.0 g/dL    Albumin 3.0 (L) 3.5 - 5.0 g/dL    Alkaline Phosphatase 274 (H) 45 - 120 U/L    AST 46 (H) 0 - 40 U/L    ALT 19 0 - 45 U/L   HM1 (CBC with Diff)   Result Value Ref Range    WBC 4.2 4.0 - 11.0 thou/uL    RBC 3.90 (L) 4.40 - 6.20 mill/uL    Hemoglobin 11.4 (L) 14.0 - 18.0 g/dL    Hematocrit 35.6 (L) 40.0 - 54.0 %    MCV 91 80 - 100 fL    MCH 29.2 27.0 - 34.0 pg    MCHC 32.0 32.0 - 36.0 g/dL    RDW 15.0 (H) 11.0 - 14.5 %    Platelets 185 140 - 440 thou/uL    MPV 10.3 8.5 - 12.5 fL    Neutrophils % 61 50 - 70 %    Lymphocytes % 23 20 - 40 %    Monocytes % 16 (H) 2 - 10 %    Eosinophils % 1 0 - 6 %    Basophils % 0 0 - 2 %     Neutrophils Absolute 2.5 2.0 - 7.7 thou/uL    Lymphocytes Absolute 1.0 0.8 - 4.4 thou/uL    Monocytes Absolute 0.7 0.0 - 0.9 thou/uL    Eosinophils Absolute 0.0 0.0 - 0.4 thou/uL    Basophils Absolute 0.0 0.0 - 0.2 thou/uL       Imaging    Ct Abdomen Pelvis Without Oral Without Iv Contrast    Result Date: 6/2/2020  EXAM: CT ABDOMEN PELVIS WO ORAL WO IV CONTRAST LOCATION: [facility] DATE/TIME: 6/2/2020 9:52 AM INDICATION: Abdominal pain, fever, postop abdominal distension. Epigastric pain, acute abdominal pain, significant distension post GJ placement. COMPARISON: 05/26/2020. TECHNIQUE: CT scan of the abdomen and pelvis was performed without oral or IV contrast. Multiplanar reformats were obtained. Dose reduction techniques were used. CONTRAST: None. FINDINGS: LOWER CHEST: Multiple bilateral pulmonary metastases in both lung bases, unchanged. Trace left pleural fluid. HEPATOBILIARY: Numerous hepatic metastases again seen. PANCREAS: Normal. SPLEEN: Mild splenomegaly. ADRENAL GLANDS: Normal. KIDNEY/BLADDER: Multiple bilateral renal cysts are unchanged. BOWEL: Masslike thickening at the GE junction is stable. New percutaneous gastrojejunostomy tube. The tube is in good position. The stomach is appropriately tacked to the anterior abdominal wall. LYMPH NODES: Numerous enlarged lymph nodes in the gastrohepatic ligament, nora hepatis and retroperitoneum are unchanged. Scant ascites adjacent to the liver and in the pelvic cul-de-sac, increased from previous. The fluid measures simple fluid density. VASCULATURE: Unremarkable. PELVIC ORGANS: Normal. MUSCULOSKELETAL: There are lytic lesions in the left ischium and in the proximal left femur. There are 2 proximal femoral lesions measuring 3.3 and 1.4 cm involving the greater trochanter and intratrochanteric right femur with cortical disruption. Small lesion involves the right posterior 10th rib and left eighth rib laterally.     1.  No evidence of a complication  status post placement of a gastrojejunostomy tube, which is in good position. 2.  Extensive pulmonary, hepatic, lymph node and skeletal metastases are unchanged.    Ct Chest With Contrast    Result Date: 5/27/2020  EXAM: CT CHEST W CONTRAST LOCATION: [facility] DATE/TIME: 5/27/2020 6:22 PM INDICATION: Indication Not Listed - See Reason for Exam extrinsic esophageal obstruction and widespread mets. looking for primary and staging. COMPARISON: None. TECHNIQUE: CT chest with IV contrast. Multiplanar reformats were obtained. Dose reduction techniques were used. CONTRAST: Iohexol (Omni) 100 mL FINDINGS: LUNGS AND PLEURA: Multiple noncalcified pulmonary nodules are present throughout all 5 lobes, largest in the right lung measures 13 mm in the right lower lobe, and largest lesion in the left lung measures 16 mm in the left lower lobe. Numerous additional  but smaller lesions range 5-10 mm. No single dominant lesion. No pulmonary mass or consolidation. No pleural effusion or pneumothorax. MEDIASTINUM/AXILLAE: There are mildly enlarged subcarinal lymph nodes. There is mid to distal esophageal wall thickening. Small hiatal hernia. Subtle retrocrural lymph node measuring 7 mm short axis. Additional paraesophageal nodes noted. Abnormally enlarged right pericardiac lymph node. Cardiac chambers unremarkable. Mild coronary arterial calcification. No pericardial effusion. Great vessels normal in caliber. UPPER ABDOMEN: Please see recent CT abdomen and pelvis from 05/26/2020 describing diffuse hepatic metastatic disease and other findings. MUSCULOSKELETAL: There is a lytic lesion within the anterior aspect of the T1 vertebral body just below cervical fusion hardware best seen on axial image 20. The lesion measures approximately 2 cm. There is a subcentimeter lytic lesion and pathologic fracture through the right third rib laterally without displacement.     1.  Numerous pulmonary nodules without single dominant nodule or  mass most compatible with pulmonary metastatic disease. 2.  T1 vertebral body and right third rib lytic lesions suspicious for osseous metastatic disease. Consider follow-up nuclear medicine total-body bone scan. 3.  Mild mediastinal adenopathy. 4.  Esophageal wall thickening and possible mass. Please see separate report for recent CT abdomen pelvis.    Ir Gj Tube Insertion    Result Date: 6/1/2020  [facility] LOCATION: [facility] DATE: 6/1/2020 PROCEDURE: PERCUTANEOUS GASTROJEJUNOSTOMY PLACEMENT INTERVENTIONAL RADIOLOGIST: Eligio Garner MD. INDICATION: Malnutrition related to dysphagia and extrinsic compression lower esophagus. Esophageal obstruction. Metastatic liver disease. SEDATION: Versed 4 mg. Fentanyl 200 mcg. The procedure was performed with administration intravenous conscious sedation with appropriate preoperative, intraoperative, and postoperative evaluation. During the time-out, immediately prior to administration of medications, the patient was reassessed for adequacy to receive conscious sedation. 36 minutes of supervised face to face conscious sedation time was provided by a radiology nurse under my direct supervision. ANTIBIOTICS: Ancef 2 g IV ADDITIONAL MEDICATIONS: Glucagon 1 mg IV. Number of images: 1 FLUOROSCOPIC TIME: 6.6 minutes CONTRAST: 25 cc Omnipaque 350 COMPLICATIONS: No immediate complications. STERILE BARRIER TECHNIQUE: Maximum sterile barrier technique was used. Cutaneous antisepsis was performed at the operative site with application of 2% chlorhexidine and large sterile drape. Prior to the procedure, the surgeon and assistant performed hand  hygiene and wore hat, mask, sterile gown, and sterile gloves during the entire procedure.  PROCEDURE/TECHNIQUE: A nasogastric tube was advanced until the tip was in the stomach. Adequate position was confirmed with fluoroscopy. The upper abdomen was prepped and draped in usual sterile fashion. The stomach was then insufflated  with air. Using fluoroscopic guidance for needle placement, two T-fasteners were deployed into the gastric lumen for gastropexy. An 18 gauge needle was then advanced into the gastric lumen. A Glidewire and catheter were advanced from the gastric lumen into the proximal jejunum. Following tract dilatation, an 18 Icelandic, 45 cm, gastrojejunostomy tube was advanced until the tip was in the proximal jejunum.  The retention balloon was inflated with 10 mL of saline. Both ports were injected with contrast and a digital spot image was obtained. FINDINGS: New percutaneous gastrojejunostomy tube, tip resides in the proximal jejunum.     Successful percutaneous gastrojejunostomy placement. CPT code for physician reference only: 24962/10676     Ir Port Placement 5+ Years    Result Date: 6/9/2020  [facility] LOCATION: [facility] DATE: 6/9/2020 PROCEDURES: 1. SUBCUTANEOUS IMPLANTED VENOUS ACCESS PORT. 2. ULTRASOUND GUIDANCE FOR VASCULAR ACCESS. 3. FLUOROSCOPIC GUIDANCE FOR CATHETER PLACEMENT. 4. MODERATE SEDATION INTERVENTIONAL RADIOLOGIST: Eligio Otto MD INDICATION: Patient is a 59-year-old male with a history of metastatic adenocarcinoma. The patient presents to Interventional Radiology for placement of a Port-A-Cath for long-term central venous access to allow for infusion and blood draws. CONSENT: The risks, benefits and alternatives of Port placement were discussed with the patient  in detail. All questions were answered. Informed consent was given to proceed with the procedure. MODERATE SEDATION: Versed 4 mg IV; Fentanyl 200 mcg IV. During the time out, immediately prior to the administration of medications, the patient was reassessed for adequacy to receive conscious sedation.  Under physician supervision, Versed and fentanyl were administered for moderate sedation. Pulse oximetry, heart rate and blood pressure were continuously monitored by an independent trained observer. The physician spent 45  minutes of face-to-face sedation time with the patient. CONTRAST: None. ANTIBIOTICS: 2 g of IV Ancef. ADDITIONAL MEDICATIONS: None. FLUOROSCOPIC TIME: 1.1 minutes. IMAGES: 1 COMPLICATIONS: No immediate complications. STERILE BARRIER TECHNIQUE: Maximum sterile barrier technique was used. Cutaneous antisepsis was performed at the operative site with application of 2% chlorhexidine and large sterile drape. Prior to the procedure, the  and assistant performed hand hygiene and wore hat, mask, sterile gown, and sterile gloves during the entire procedure. PROCEDURE: After discussing the procedure and risks, informed consent was obtained. Permanent ultrasound images were obtained of the right internal jugular vein, documenting patency and compressibility. The right neck and upper chest were prepped and draped. After local anesthesia, the jugular vein was punctured under direct ultrasound guidance, and a small dilator was placed. A short transverse skin incision was made below the clavicle, and a pocket was created for the port. A skin tunnel was created from the pocket to the vein entry site, and the catheter was pulled through the tunnel. The vein was dilated and the catheter inserted through a peel-away sheath, positioning the tip fluoroscopically at  the SVC/atriocaval junction. The catheter was cut to an appropriate length. The catheter was then attached to the port itself and the port was placed within the subcutaneous pocket. The port was secured within the pocket utilizing two anchoring sutures.  The port was flushed with heparin. The incision was closed with layered absorbable suture and covered with Dermabond. The patient tolerated the procedure, and there were no immediate complications. FINDINGS: Ultrasound shows an anechoic and compressible jugular vein. A permanent ultrasound image of this was saved. After placement, fluoroscopic spot images show that the tip of the catheter is at the SVC/atriocaval  junction.     1.  Uneventful venous access port placement. This port is power injector compatible. CPT codes: 56671, 61821, 10659, 98160, 99153 x 2    Image Guided Liver Biopsy    Result Date: 5/28/2020  EXAM: 1. PERCUTANEOUS BIOPSY RIGHT HEPATIC MASS 2. ULTRASOUND GUIDANCE 3. CONSCIOUS SEDATION LOCATION: [facility] DATE/TIME: 5/28/2020 3:20 PM INDICATION: Multiple hepatic masses, upper abdominal lymphadenopathy and pulmonary metastases of uncertain etiology. PROCEDURE: Informed consent obtained. Site marked. Prior images reviewed. Required items made available. Patient identity was confirmed verbally and with arm band. Patient reevaluated immediately before administering sedation. Universal protocol was followed. Time out performed. A limited ultrasound of the abdomen is performed to localize a right hepatic mass. The right upper quadrant was prepped and draped in sterile fashion. 10 mL of 1 percent lidocaine was infused into the local soft tissues. Using standard technique and under direct ultrasound guidance, a 17/18 gauge Temno biopsy needle was used to make subtotal of 6 core biopsies. Preliminary review the cytopathologist deemed the specimens to be adequate. Needle tract was plugged with Gelfoam. The patient tolerated the procedure well. No complications. RADIOLOGIC SUPERVISION AND INTERPRETATION: ULTRASOUND GUIDANCE: Images demonstrate the biopsy needle in satisfactory position. SEDATION: Versed 1 mg. Fentanyl 50 mcg. The procedure was performed with administration intravenous conscious sedation with appropriate preoperative, intraoperative, and postoperative evaluation. 15 minutes of supervised face to face conscious sedation time was provided by a radiology nurse under my direct supervision.     Ultrasound-guided biopsy of right hepatic lobe mass performed without complication. Final pathology pending. Reference CPT Code: 09979, 95332, 11714    Ct Abdomen Pelvis Without Oral With Iv  Contrast    Result Date: 6/7/2020  EXAM: CT ABDOMEN PELVIS WO ORAL W IV CONTRAST LOCATION: [facility] DATE/TIME: 6/7/2020 1:51 PM INDICATION: newly diagnosed pancreatic cancer with metastasis, G/J tube placed 2 weeks ago, now vomiting, abd distension, tube leaking COMPARISON: 6/2/2020 and 5/26/2020 CT TECHNIQUE: CT scan of the abdomen and pelvis was performed following injection of IV contrast. Multiplanar reformats were obtained. Dose reduction techniques were used. CONTRAST: Iohexol (Omni) 100 mL FINDINGS: LOWER CHEST: Metastatic lung nodules, similar to previous. HEPATOBILIARY: Diffuse hepatic metastases. Small amount upper abdominal ascites has increased since 05/26/2020. Upper abdominal venous collaterals have developed. Main and right portal vein are patent. The left portal vein is attenuated by tumor. PANCREAS: Hypodense 2.4 cm mass which appears to originate in the pancreatic head. This hypodensity is contiguous with a 6.3 cm hypodense mass which extends cephalad towards the nora hepatis. This could be adenopathy, or mass continuation. No pancreatic  duct dilation. SPLEEN: Splenomegaly measures 16.3 cm. ADRENAL GLANDS: Normal. KIDNEYS/BLADDER: Several left renal cysts. The largest measures 5.1 cm. BOWEL: Good position gastrostomy balloon and GJ tube. There is a small amount ascites adjacent the gastric tube exit site. LYMPH NODES: Multiple small upper abdominal para-aortic nodes are likely metastatic. VASCULATURE: Unremarkable. PELVIC ORGANS: Small amount pelvic ascites, increased from previous. MUSCULOSKELETAL: No definite skeletal lesions seen.     1.  Good position GJ tube. 2.  Small amount ascites, increased from previous. This could be the cause of leakage from the GJ tube site. 3.  No significant change in the hepatic and lung metastases. 4.  Splenomegaly. 5.  Hypodense pancreas head mass could represent the neoplasm primary.     Ct Abdomen Pelvis Without Oral With Iv Contrast    Result Date:  5/26/2020  EXAM: CT ABDOMEN PELVIS WO ORAL W IV CONTRAST LOCATION: [facility] DATE/TIME: 5/26/2020 6:48 PM INDICATION: Epigastric pain with sensation of obstruction and regurgitation of undigested food for several weeks. COMPARISON: None. TECHNIQUE: CT scan of the abdomen and pelvis was performed following injection of IV contrast. Multiplanar reformats were obtained. Dose reduction techniques were used. CONTRAST: Iohexol (Omni) 100 mL FINDINGS: LOWER CHEST: Multiple bilateral pulmonary nodules within both visualized lung fields the largest in the right lower lobe measuring 14 x 13 mm. Diffuse wall thickening distal esophagus mildly distended with fluid. HEPATOBILIARY: Extensive metastatic disease throughout the liver with a large confluent mass encompassing a significant portion of the left hepatic lobe measuring 13 x 8 cm. PANCREAS: Normal. SPLEEN: Splenomegaly measuring 19 cm in length. 1.5 cm hypodense splenic lesion. ADRENAL GLANDS: Normal. KIDNEYS/BLADDER: Multiple left renal cysts the largest measuring 5 cm. No hydronephrosis. BOWEL: Normal appendix. No evidence for bowel obstruction. Scattered diverticula descending and sigmoid colon, without evidence for diverticulitis. LYMPH NODES: Several enlarged nora hepatis and gastrohepatic lymph nodes measuring up to 2.7 x 1.9 cm. VASCULATURE: Unremarkable. PELVIC ORGANS: Trace ascites. MUSCULOSKELETAL: Degenerative disc disease L5 level. No stranding skeletal lesions.     1.  Thickening involving the wall of the distal esophagus concerning for a primary esophageal neoplasm. GI consultation recommended. 2.  Widespread metastatic disease throughout the liver. 3.  Splenomegaly with several hypodense lesions also concerning for metastatic disease. 4.  Enlarged nora hepatis and retroperitoneal lymph nodes. 5.  Numerous bilateral pulmonary nodules compatible with metastases.     Us Abdomen Limited    Result Date: 6/8/2020  EXAM: US ABDOMEN LIMITED LOCATION: [facility] DATE/TIME: 6/8/2020 3:53 PM INDICATION: Ascites COMPARISON: None. TECHNIQUE: Limited abdominal ultrasound. FINDINGS: Scans of all 4 quadrants demonstrates only a tiny wisp of ascitic fluid, far too little fluid to safely aspirate. No paracentesis performed.    Us Abdomen Limited    Result Date: 5/30/2020  EXAM: US ABDOMEN LIMITED LOCATION: [facility] DATE/TIME: 5/30/2020 2:39 AM INDICATION: s/p liver biopsy felt more distended, vomiting, more pain - evaluate for bleeding COMPARISON: CT dated 05/26/2020 TECHNIQUE: Limited abdominal ultrasound. FINDINGS: GALLBLADDER: No dedicated assessment performed. BILE DUCTS: No dedicated assessment performed.. LIVER: Multifocal hepatic metastatic disease. Underlying steatosis. RIGHT KIDNEY: No hydronephrosis.. PANCREAS: No dedicated assessment performed. Small volume of perihepatic free fluid..     1.  Small volume of perihepatic fluid. 2.  Multifocal hepatic metastatic disease.        Signed by: Denise Jon MD

## 2021-06-09 NOTE — PATIENT INSTRUCTIONS - HE
Recent Results (from the past 24 hour(s))   Magnesium    Collection Time: 06/24/20 10:08 AM   Result Value Ref Range    Magnesium 2.1 1.8 - 2.6 mg/dL   Comprehensive Metabolic Panel    Collection Time: 06/24/20 10:08 AM   Result Value Ref Range    Sodium 137 136 - 145 mmol/L    Potassium 4.8 3.5 - 5.0 mmol/L    Chloride 104 98 - 107 mmol/L    CO2 25 22 - 31 mmol/L    Anion Gap, Calculation 8 5 - 18 mmol/L    Glucose 118 70 - 125 mg/dL    BUN 13 8 - 22 mg/dL    Creatinine 0.64 (L) 0.70 - 1.30 mg/dL    GFR MDRD Af Amer >60 >60 mL/min/1.73m2    GFR MDRD Non Af Amer >60 >60 mL/min/1.73m2    Bilirubin, Total 0.9 0.0 - 1.0 mg/dL    Calcium 12.3 (HH) 8.5 - 10.5 mg/dL    Protein, Total 7.0 6.0 - 8.0 g/dL    Albumin 3.0 (L) 3.5 - 5.0 g/dL    Alkaline Phosphatase 274 (H) 45 - 120 U/L    AST 46 (H) 0 - 40 U/L    ALT 19 0 - 45 U/L   HM1 (CBC with Diff)    Collection Time: 06/24/20 10:08 AM   Result Value Ref Range    WBC 4.2 4.0 - 11.0 thou/uL    RBC 3.90 (L) 4.40 - 6.20 mill/uL    Hemoglobin 11.4 (L) 14.0 - 18.0 g/dL    Hematocrit 35.6 (L) 40.0 - 54.0 %    MCV 91 80 - 100 fL    MCH 29.2 27.0 - 34.0 pg    MCHC 32.0 32.0 - 36.0 g/dL    RDW 15.0 (H) 11.0 - 14.5 %    Platelets 185 140 - 440 thou/uL    MPV 10.3 8.5 - 12.5 fL    Neutrophils % 61 50 - 70 %    Lymphocytes % 23 20 - 40 %    Monocytes % 16 (H) 2 - 10 %    Eosinophils % 1 0 - 6 %    Basophils % 0 0 - 2 %    Neutrophils Absolute 2.5 2.0 - 7.7 thou/uL    Lymphocytes Absolute 1.0 0.8 - 4.4 thou/uL    Monocytes Absolute 0.7 0.0 - 0.9 thou/uL    Eosinophils Absolute 0.0 0.0 - 0.4 thou/uL    Basophils Absolute 0.0 0.0 - 0.2 thou/uL

## 2021-06-09 NOTE — TELEPHONE ENCOUNTER
Addendum to telephone call note of 7/14/2020:  Pt states his wife has been off, but is going back to work at [employer] and has arranged her FMLA to be able to take time by hourly increments rather than having to take whole days. Even so, he is interested in if there are rides to his oncology appts he could arrange.     Navigator has not found rides availability at this time due to Covid. Will discuss other options ([employer]?) with him and request SW advice as well.     Finances: Pt had to retire 2 years ago due to another medical condition. His insurance continues to be good. He does not believe they qualify for [employer] general zo, but agreed to have writer apply for [employer], which has been done. He prefers [employer] cards.     Pt and his wife have an adult daughter living with them at this time, but do not claim her on their taxes.     In discussing resources writer has for patient, he expressed interest in receiving information on stress reduction techniques. He prefers [employer] for communication over email.     Writer will send Navigation introduction letter, stress reduction and mindfulness resource documents, a Supportives (Palliative) Care booklet from [facility] and [facility] Cancer community Resources document.

## 2021-06-09 NOTE — TELEPHONE ENCOUNTER
[facility] pharmacy will not have in stock until 7/20/20.  Need new RX sent to [facility] due to controlled substance.   Thanks,   Barbra Tinoco RN

## 2021-06-09 NOTE — PATIENT INSTRUCTIONS - HE
Recent Results (from the past 24 hour(s))   Magnesium    Collection Time: 07/08/20 10:31 AM   Result Value Ref Range    Magnesium 2.5 1.8 - 2.6 mg/dL   Comprehensive Metabolic Panel    Collection Time: 07/08/20 10:31 AM   Result Value Ref Range    Sodium 139 136 - 145 mmol/L    Potassium 4.6 3.5 - 5.0 mmol/L    Chloride 106 98 - 107 mmol/L    CO2 27 22 - 31 mmol/L    Anion Gap, Calculation 6 5 - 18 mmol/L    Glucose 121 70 - 125 mg/dL    BUN 13 8 - 22 mg/dL    Creatinine 0.68 (L) 0.70 - 1.30 mg/dL    GFR MDRD Af Amer >60 >60 mL/min/1.73m2    GFR MDRD Non Af Amer >60 >60 mL/min/1.73m2    Bilirubin, Total 0.6 0.0 - 1.0 mg/dL    Calcium 13.1 (HH) 8.5 - 10.5 mg/dL    Protein, Total 7.4 6.0 - 8.0 g/dL    Albumin 3.2 (L) 3.5 - 5.0 g/dL    Alkaline Phosphatase 313 (H) 45 - 120 U/L    AST 41 (H) 0 - 40 U/L    ALT 15 0 - 45 U/L   HM1 (CBC with Diff)    Collection Time: 07/08/20 10:31 AM   Result Value Ref Range    WBC 6.8 4.0 - 11.0 thou/uL    RBC 4.00 (L) 4.40 - 6.20 mill/uL    Hemoglobin 11.8 (L) 14.0 - 18.0 g/dL    Hematocrit 36.8 (L) 40.0 - 54.0 %    MCV 92 80 - 100 fL    MCH 29.5 27.0 - 34.0 pg    MCHC 32.1 32.0 - 36.0 g/dL    RDW 15.9 (H) 11.0 - 14.5 %    Platelets 141 140 - 440 thou/uL    MPV 10.7 8.5 - 12.5 fL    Neutrophils % 69 50 - 70 %    Lymphocytes % 18 (L) 20 - 40 %    Monocytes % 12 (H) 2 - 10 %    Eosinophils % 0 0 - 6 %    Basophils % 0 0 - 2 %    Neutrophils Absolute 4.7 2.0 - 7.7 thou/uL    Lymphocytes Absolute 1.2 0.8 - 4.4 thou/uL    Monocytes Absolute 0.8 0.0 - 0.9 thou/uL    Eosinophils Absolute 0.0 0.0 - 0.4 thou/uL    Basophils Absolute 0.0 0.0 - 0.2 thou/uL     Mr Abdomen With Without Contrast    Result Date: 7/6/2020  EXAM: MR ABDOMEN W WO CONTRAST LOCATION: [facility] DATE/TIME: 7/6/2020 9:57 AM INDICATION: Neoplasm: pancreas Metastatic cancer thought to be of pancreatic primary.  Looking for the primary in the pancreas. COMPARISON: 6/7/20. TECHNIQUE: Routine MRI abdomen protocol  including T1 in/out phase, diffusion, multiplane T2, and dynamic T1 with IV contrast. CONTRAST: Gadavist 10ml FINDINGS: The liver is enlarged. There are numerous peripherally enhancing metastatic lesions throughout the liver which exhibit central low intensity on T1-weighted imaging and hyperintensity on T2-weighted imaging. Normal gallbladder. There is mild left bile duct dilatation. Normal course and caliber of the pancreatic duct. A large gastrohepatic/nora hepatis lymph node abuts the body of the pancreas (series 12 image 72). Otherwise the pancreas is normal in appearance. There are multiple enlarged gastrohepatic, nora hepatis, and retroperitoneal lymph nodes. The spleen is enlarged. There is a 1.3 cm lesion near the upper posterior spleen. There is a small amount of fluid in the upper abdomen. The major vascular structures of the abdomen are normal in caliber and are patent. Multiple simple renal cysts are present. No dedicated follow-up is required. A percutaneous GJ tube is present. Normal adrenal glands. Multiple pulmonary nodules are noted. A T9 hemangioma is likely.     1.  No definite primary pancreatic lesion identified. 2.  There are multiple enlarged abdominal lymph nodes, hepatic lesions, and pulmonary nodules which represent metastatic disease. A lesion in or adjacent to the spleen could represent a splenic lesion or peritoneal spread of disease. 3.  The major vascular structures of the abdomen are normal in caliber and are patent.     Ir Port Placement 5+ Years    Result Date: 6/9/2020  [facility] RADIOLOGY LOCATION: [facility] DATE: 6/9/2020 PROCEDURES: 1. SUBCUTANEOUS IMPLANTED VENOUS ACCESS PORT. 2. ULTRASOUND GUIDANCE FOR VASCULAR ACCESS. 3. FLUOROSCOPIC GUIDANCE FOR CATHETER PLACEMENT. 4. MODERATE SEDATION INTERVENTIONAL RADIOLOGIST: Eligio Otto MD INDICATION: Patient is a 59-year-old male with a history of metastatic adenocarcinoma. The patient presents to Interventional  Radiology for placement of a Port-A-Cath for long-term central venous access to allow for infusion and blood draws. CONSENT: The risks, benefits and alternatives of Port placement were discussed with the patient  in detail. All questions were answered. Informed consent was given to proceed with the procedure. MODERATE SEDATION: Versed 4 mg IV; Fentanyl 200 mcg IV. During the time out, immediately prior to the administration of medications, the patient was reassessed for adequacy to receive conscious sedation.  Under physician supervision, Versed and fentanyl were administered for moderate sedation. Pulse oximetry, heart rate and blood pressure were continuously monitored by an independent trained observer. The physician spent 45 minutes of face-to-face sedation time with the patient. CONTRAST: None. ANTIBIOTICS: 2 g of IV Ancef. ADDITIONAL MEDICATIONS: None. FLUOROSCOPIC TIME: 1.1 minutes. IMAGES: 1 COMPLICATIONS: No immediate complications. STERILE BARRIER TECHNIQUE: Maximum sterile barrier technique was used. Cutaneous antisepsis was performed at the operative site with application of 2% chlorhexidine and large sterile drape. Prior to the procedure, the  and assistant performed hand hygiene and wore hat, mask, sterile gown, and sterile gloves during the entire procedure. PROCEDURE: After discussing the procedure and risks, informed consent was obtained. Permanent ultrasound images were obtained of the right internal jugular vein, documenting patency and compressibility. The right neck and upper chest were prepped and draped. After local anesthesia, the jugular vein was punctured under direct ultrasound guidance, and a small dilator was placed. A short transverse skin incision was made below the clavicle, and a pocket was created for the port. A skin tunnel was created from the pocket to the vein entry site, and the catheter was pulled through the tunnel. The vein was dilated and the catheter inserted  through a peel-away sheath, positioning the tip fluoroscopically at  the SVC/atriocaval junction. The catheter was cut to an appropriate length. The catheter was then attached to the port itself and the port was placed within the subcutaneous pocket. The port was secured within the pocket utilizing two anchoring sutures.  The port was flushed with heparin. The incision was closed with layered absorbable suture and covered with Dermabond. The patient tolerated the procedure, and there were no immediate complications. FINDINGS: Ultrasound shows an anechoic and compressible jugular vein. A permanent ultrasound image of this was saved. After placement, fluoroscopic spot images show that the tip of the catheter is at the SVC/atriocaval junction.     1.  Uneventful venous access port placement. This port is power injector compatible. CPT codes: 39547, 47566, 95371, 50177, 99153 x 2    Us Abdomen Limited    Result Date: 6/8/2020  EXAM: US ABDOMEN LIMITED LOCATION: [facility] DATE/TIME: 6/8/2020 3:53 PM INDICATION: Ascites COMPARISON: None. TECHNIQUE: Limited abdominal ultrasound. FINDINGS: Scans of all 4 quadrants demonstrates only a tiny wisp of ascitic fluid, far too little fluid to safely aspirate. No paracentesis performed.

## 2021-06-09 NOTE — TELEPHONE ENCOUNTER
Navigator called patient and spoke to him briefly. Informed him that two envelopes of information are coming to him via mail, and that I emailed him today with information and resources about rides to/from medical appointments. Writer wanted to be sure he knew to check for the email.   Pt expressed appreciation. Does not have any other needs at this time.     Will continue to follow.

## 2021-06-09 NOTE — PROGRESS NOTES
Pt amb into clinic for chemo infusion. Reviewed plan of care. Pt received IVF and Zometa infusions as well. Pt premedicated. Pt tolerated infusions well, using BR several times during treatment. 5-fu via CADD pump programmed, double-checked. Reviewed dc instructions including need to increase feeding tube/po hydration. Pt states understanding. Pt amb out of clinic. Pt will return for pump dc Friday about 1245.

## 2021-06-09 NOTE — PROGRESS NOTES
Patient is here for two week follow-up of pancreatic cancer. Due D1C4 FOLFOX pending labs/OV with Dr. Jon.  Barbra Tinoco RN

## 2021-06-10 NOTE — PROGRESS NOTES
Pt arrived to infusion clinic. Port accessed with great blood return. Labs reviewed. Pt tolerated Oxaliplatin, Irinotecan, and Leucovorin infusions well.  5FU push given followed by 5FU pump connection at 1550, pt due back 1350 on Friday. Pt ambulated out of infusion clinic independently.

## 2021-06-10 NOTE — TELEPHONE ENCOUNTER
" Dr. Jon, send to ER? See in a few days for evaluation?    Wife called and LM, says patient lower legs continue to be edematous, now have \"red blotches\" up to knees. It is difficult for patient to stand, \"he says it feels like the muscles are being torn from his legs\", no strength, generally doesn't feel well.   He cancelled radiation yesterday and likely will have to today.   Barbra Tinoco RN    "

## 2021-06-10 NOTE — PROGRESS NOTES
Patient is here for two week follow-up of pancreatic cancer. Due [id number] pending labs/OV with Chelo HENDRICKS CNP.  Barbra Tinoco RN

## 2021-06-10 NOTE — PROCEDURES
Procedures  Clinical Treatment Planning Note    This is a 59-year-old male with diagnosis of Stage IV pancreatic cancer with clinically symptomatic right hip metastasis.  The palliative radiation therapy is recommended to the patient. The patient is simulated today in the supine position with head rest and under knee cushion to help keep the same position during the daily radiation therapy. 2-3 field will be used to set up radiation therapy fields to include the right hip region with margin. I plan to give him radiation therapy at 300 cGy each fraction to a total of 3000 cGy in 10 treatments.        Suzi Easley MD, PhD  Department of Radiation Oncology   [facility]  Tel: [phone]  Page: [phone]    [facility]  [address] [place] [address]     [place] [id number] [place] [address]

## 2021-06-10 NOTE — TELEPHONE ENCOUNTER
Patient's wife, Barbra, called and left a message stating Dr. Jon ordered morphine pills to be taken two times per day and told him to start taking right away.  She said [facility] said insurance said too early to fill since they just filled morphine liquid.  She asked that we call Pee at [phone].  She asked we call Johnie back 1st, as she is working, at [phone] and ok to leave message. She said we could then try her and leave message at [phone].    I called [facility].  I talked to Keith, Pharmacist and said medication needs PA as insurance says it's above max dose and they are unable to override.  He will fax PA to me at [phone].    PA received at faxed to SYDNEE Lynn, Oral Chemo Liason.    Barbra called back.  I told her that medication requires a PA and that it is currently pending. I told her that it can sometimes take 2-3 days for response.  I did tell her that Patrick has put in at Urgent request.  She said that the Pharmacist told her that they could pay out of pocket for a 15 day supply for $24. She said if it doesn't get approved by end of the work day, she is going to pay out of pocket. She said he can't wait 3 days to get an answer. Dr. Jon wanted him to start the medication yesterday. I told her I would call patient/her back as soon as I had response.  She verbalized understanding/ISRRAEL Haney RN    PA still pending. Barbra notified. She will check back tomorrow on the status. I told her I would call her if I have any updates, too.    Patient has [facility] paperwork that requires signatures from Dr. Jon. She will send with patient tomorrow when he is here for pump disconnect.  She asked that we fax to [facility] after signed as they don't have access to fax. Message sent to JODEE Irving RN/ISRRAEL Haney RN    8/7/20-PA for Morphine tab approved. SYDNEE Lynn said [facility] will call patient when medication ready. I called and left Barbra a message stating morphine approved and [facility]  will call when ready.  JODEE Irving will also notify patient when at clinic later today for pump disconnect/ISRRAEL Haney RN

## 2021-06-10 NOTE — TELEPHONE ENCOUNTER
Per Dr. Jon, it would be best to keep radiation appointment as they can evaluate and determine how to proceed.   Otherwise, he can go to ER.  He needs evaluation and he does not have an office visit here until 9/09. Another more extensive option would be to stop treatments and refer to Hospice.  Left message for patient to return call.   Barbra Tinoco RN

## 2021-06-10 NOTE — PROGRESS NOTES
Pt here for consult with Dr. Easley for palliative RT for bone mets. He states right hip a bit greater than left. Some abd discomfort from tumors as well. Gave pt the new patient folder and we reviewed the routine for RT including consult, simulation CT, tx positioning, daily treatment, weekly RO visits, and common s/e of fatigue and unlikely skin irritation. He verbalized his understanding.

## 2021-06-10 NOTE — TELEPHONE ENCOUNTER
"Johnie's spouse, Barbra, calls today to report that the foot/ankle edema has had ongoing in worsening in the last week. The tops of the feet are \"very swollen and even slightly red.\" She said when he woke up today, the feet were swollen. She tells me his nutrition has been poor; he has been trying to take in more foods by mouth, and less tube feedings, however, it is not going well. He isn't able to get much intake down. She also tells me he has been more active with his feet dependent.   I explained that poor intake of protein can cause increased swelling, and noted his albumin to be 2.6 on 8/19.   I advised that she should reach out to the nutritionist to discuss dietary protein, and also encouraged her to have Johnie elevate his feet above the level of the heart when he is resting.   He is due for C3 FOLFIRINOX (delayed last week due to thrombocytopenia) on Wednesday, 8/26 (no provider appt scheduled for that tx).  She expressed understanding.   RN will route this message to Gricelda Avalos, Dietician and also update Dr. Jon to see if there is additional advisement. Britt Pappas    "

## 2021-06-10 NOTE — TELEPHONE ENCOUNTER
Patient's wife called for refill of liquid morphine.  Sent to [facility] pharmacy yesterday by Dr. Jon. Wife notified and she will contact pharmacy/ISRRAEL Haney RN

## 2021-06-10 NOTE — PROGRESS NOTES
[facility] Hematology and Oncology Progress Note    Patient: Jay Oneil  MRN: [id number]  Date of Service: 08/19/2020        Reason for Visit    Stage IV pancreatic cancer    ECOG Performance   ECOG Performance Status: 2    Distress Assessment  Distress Assessment Score: 2    Pain  Pain Score (Initial OR Reassessment): 6      Assessment/Plan  1. Stage IV pancreatic cancer to liver, spleen, bones, lung  Regimen was changed to FOLFIRINOX last cycle, 2 weeks ago.  He also received Neulasta.  He tolerated this generally well. Side effects well-controlled.  Lab work shows thrombocytopenia (82K). Leukocytosis likely from the Neulasta.    Will defer cycle 2 one week for the thrombocytopenia. Will omit 5FU bolus with next cycle and keep rest of drug doses the same. Will keep on two week cycle for now.    Johnie is undergoing right hip radiation, just starting this week and will complete 10 fractions total.  Because the radiation field is small, Dr. Easley did not feel he needs to change his chemotherapy schedule and we can proceed with concurrent chemotherapy next week if platelets recover.    He will return in 3 weeks, ahead of cycle 3.  He will be due for his monthly dose of Xgeva at that time.    Plan is to repeat PET/CT or CT scan after 4 cycles, if clinically stable and CA-19-9 level overall stable/responding in interim.    2.   Nutrition/weight loss  This is been an ongoing struggle for the patient, but he has stabilized this/gained a few pounds.  He is seen his G-tube for his primary nutritional intake and supplementing by mouth as able.  He was referred to dietitian.  They hope to see response to chemotherapy, that will in turn improve his cachexia but it may be too early to assess for this.    3.  Pain management  Palliative radiation oncology consult for the bilateral femoral metastases with some cortical thinning, placing him at increased risk for fracture.  He just started palliative radiation to the right  proximal femur/hip on Monday and will complete this over 10 fractions on August 28. Hopefully, he will see some improvement in his pain within a few weeks of completing the RT.    He was started on MS Contin 15 mg orally twice daily 2 weeks ago.  He is using liquid morphine 5 mg every 4 hours on average for the right hip pain. He will increase the MS Contin to 15 mg three times a day, new Rx sent. If his pain improves after RT we can work on weaning down as able.    Bowels have been regular.    4.   Hypercalcemia of malignancy  Zometa was changed to Xgeva at his appointment 2 weeks ago.  Has had IV fluids as needed and is been working on getting more fluids via G-tube/orally.  He's getting adequate hydration through the GTube. Today, calcium is down to 8.6.  Will continue monitoring closely.    ______________________________________________________________________________    History of Present Illness/ Interval History    Mr. Jay Oneil  is a 59 y.o. returning for cycle 2 FOLFIRINOX.    He tolerated the last cycle with the addition of the irinotecan generally well.  He had mild nausea without emesis on occasion over the last few weeks.  If he takes his antiemetics, this is typically helpful but sometimes it comes on fairly quickly for him.  He feels the dysphasia is a little worse.  Is using his G-tube for most all of his nutrition and hydration and has been able to gain a couple of pounds in the past 2 weeks.  Is getting plenty of hydration through his G-tube.  Baseline soft/loose stools of 2-3/day has been stable, he did not note any changes with the chemotherapy.  Cold sensitivity without persistent neuropathy.    Since starting MS Contin 15 mg twice daily, his generalized sites of pain are markedly improved.  He is still having quite a bit of pain in his right hip and is taking MS IR liquid 5 mg every 4 hours around-the-clock to manage this.  He just started radiation to the right hip earlier this  week.      No bleeding.  No fevers.       Review of Systems    Constitutional  Constitutional (WDL): Exceptions to WDL  Fatigue: Fatigue not relieved by rest - Limiting instrumental ADL  Fever: None  Chills: None  Weight Gain: 5 - <10% from baseline(2#)  Weight Loss: None  Neurosensory  Neurosensory (WDL): Exceptions to WDL  Peripheral Motor Neuropathy: Asymptomatic, clinical or diagnostic observations only, intervention not indicated  Ataxia: Moderate symptoms, limiting instrumental ADL  Peripheral Sensory Neuropathy: Moderate symptoms, limiting instrumental ADL(cold sensitivity, hands)  Confusion: Mild disorientation(mental fog, slight)  Syncope: None  Cardiovascular  Cardiovascular (WDL): Exceptions to WDL  Palpitations: None  Edema: Yes  Edema Limbs: Grade 2  Edema Trunk: Swelling or obscuration of anatomic architecture on close inspection  Phlebitis: None  Superficial thrombophlebitis: None  Pulmonary  Respiratory (WDL): Exceptions to WDL  Cough: None  Dyspnea: Shortness of breath with moderate exertion  Hypoxia: None  Gastrointestinal  Gastrointestinal (WDL): Exceptions to WDL  Anorexia: Loss of appetite without alteration in eating habits  Constipation: None  Diarrhea: None  Dysphagia: Life-threatening consequences, urgent intervention indicated(G-tube continuous)  Esophagitis: Severely altered eating/swallowing, tube feeding, TPN or hospitalization indicated  Nausea: Loss of appetite without alteration in eating habits  Pharyngitis: None  Vomiting: None  Dysgeusia: Altered taste with change in diet (e.g., oral supplements), noxious or unpleasant taste, loss of taste  Dry Mouth: Symptomatic (e.g., dry or thick saliva) without significant dietary alteration, unstimulated saliva flow >0.2 ml/min  Genitourinary  Genitourinary (WDL): All genitourinary elements are within defined limits  Integumentary  Integumentary (WDL): All integumentary elements are within defined limits(dry hands)  Patient Coping  Patient  Coping: Open/discussion  Distress Assessment  Distress Assessment Score: 2  Accompanied by  Accompanied by: Alone        Oncology History/Treatment  Diagnosis/Stage:   5/2020: Stage IV pancreaticobiliary cancer to liver, spleen, lung, abd nodes, bone  -presented with hypercalcemia (15.7) while undergoing routine pre-op work-up for a right hip replacement.  In retrospect he has been having increasing acid reflux symptoms, dysphasia and 30 pound weight loss.  -CT: Thickening distal wall of esophagus, widespread metastatic lesions in liver, splenic metastasis, enlarged upper abdominal lymph nodes and multiple bilateral lung mets.  -Upper GI endoscopy: Extrinsic compression of distal end of esophagus secondary to a metastatic lesion in wall of esophagus, but no clear esophageal mucosal lesion  -Biopsy from liver metastasis: Moderately differentiated adenocarcinoma (CK 7+, CD19 9+ and p53 positive).  Pathologic impression felt consistent with metastatic pancreaticobiliary primary  -CA 19-9 level elevated  -Hypercalcemia treated with IV fluids and Zometa with improvement    -Gastrostomy tube placed for severe dysphasia (initial attempt at GJ tube feeding tolerated well by patient due to abdominal cramping/diarrhea)      Treatment:  Hannah 10, 2020 -July 8, 2020: Completed palliative FOLFOX x 4 cycles  -AZ after 4 cycles (PET scan August 3, 2020: Reduction in liver metastases, splenic metastases; pancreatic mass, multiple lymph nodes and multiple bone metastases evident).  However, with recurrent hypercalcemia (13.3) and continued weight loss/cachexia related to the cancer indicating somewhat of a mixed response.    August 3, 2020: Regimen intensity increased to FOLFORINOX, with Neulasta support  -Cycle 2 delayed 1 week and 5-FU bolus omitted for thrombocytopenia    August 17, 2020 - present: Palliative radiation to right proximal femur/hip (3000 cGy/10 fractions planned)    Supportive cares:  -June-August, 2020: Zometa  every 4 weeks for hypercalcemia with suboptimal control  -August 3, 2020: Changed to Xgeva every 4 weeks for hypercalcemia and bone metastases. IV fluids as needed for hypercalcemia.      Past History  Past Medical History:   Diagnosis Date     Chronic pain      COPD (chronic obstructive pulmonary disease) (H)      Gout      Hypercholesterolemia      Hypertension      Low back pain with sciatica      Obstructive sleep apnea 03/23/2008     Osteoarthritis          Past Surgical History:   Procedure Laterality Date     CERVICAL FUSION  07/09/2018    C5-C7     COLONOSCOPY  2001     COLONOSCOPY  09/2006     COLONOSCOPY  03/26/2010     COLONOSCOPY  10/23/2015     ESOPHAGOGASTRODUODENOSCOPY  05/27/2020     IR GJ TUBE INSERTION  6/1/2020     IR PORT PLACEMENT >5 YEARS  6/9/2020     LUMBAR LAMINECTOMY  1985, 2002    L4- L5 level.  X2.     RI ESOPHAGOGASTRODUODENOSCOPY TRANSORAL DIAGNOSTIC N/A 5/27/2020    Procedure: ESOPHAGOGASTRODUODENOSCOPY (EGD);  Surgeon: Yasmany Petit MD;  Location: [facility];  Service: Gastroenterology     SHOULDER ARTHROSCOPY W/ ACROMIAL REPAIR Right 01/14/2016     TONSILLECTOMY  1964       Physical Exam    Recent Vitals 8/19/2020   Height -   Weight 200 lbs 2 oz   BSA (m2) 2.1 m2   /67   Pulse 85   Temp 98.4   Temp src 1   SpO2 96   Some recent data might be hidden       GENERAL: Alert and oriented to time place and person. Seated comfortably. In no distress.  Masked.  HEAD: Atraumatic and normocephalic. No alopecia.  EYES: CHRISTEN, EOMI. No erythema. No icterus.  ORAL CAVITY: Moist. No mucosal lesion or tonsillar enlargement.  NECK: supple. No thyroid enlargement.  LYMPH NODES: No palpable supraclavicular, cervical lymphadenopathy.  CHEST: clear to auscultation bilaterally. Resonant to percussion throughout bilaterally. Symmetrical breath movements bilaterally.  CVS: S1 and S2 are heard. Regular rate and rhythm. No murmur or gallop or rub heard.  ABDOMEN: Soft. Not tender.   Moderately distended. No palpable hepatomegaly or splenomegaly. No other mass palpable. Bowel sounds present.  G-tube site clean and dry.  EXTREMITIES: Warm.  Trace peripheral edema.  SKIN: no rash, or bruising or purpura.   NEURO: No gross deficit noted. Non-antalgic gait.    Lab Results    Recent Results (from the past 168 hour(s))   Magnesium   Result Value Ref Range    Magnesium 1.8 1.8 - 2.6 mg/dL   Comprehensive Metabolic Panel   Result Value Ref Range    Sodium 138 136 - 145 mmol/L    Potassium 4.1 3.5 - 5.0 mmol/L    Chloride 107 98 - 107 mmol/L    CO2 26 22 - 31 mmol/L    Anion Gap, Calculation 5 5 - 18 mmol/L    Glucose 126 (H) 70 - 125 mg/dL    BUN 15 8 - 22 mg/dL    Creatinine 0.62 (L) 0.70 - 1.30 mg/dL    GFR MDRD Af Amer >60 >60 mL/min/1.73m2    GFR MDRD Non Af Amer >60 >60 mL/min/1.73m2    Bilirubin, Total 0.5 0.0 - 1.0 mg/dL    Calcium 8.7 8.5 - 10.5 mg/dL    Protein, Total 6.0 6.0 - 8.0 g/dL    Albumin 2.6 (L) 3.5 - 5.0 g/dL    Alkaline Phosphatase 243 (H) 45 - 120 U/L    AST 31 0 - 40 U/L    ALT 14 0 - 45 U/L   HM1 (CBC with Diff)   Result Value Ref Range    WBC 11.8 (H) 4.0 - 11.0 thou/uL    RBC 3.19 (L) 4.40 - 6.20 mill/uL    Hemoglobin 9.7 (L) 14.0 - 18.0 g/dL    Hematocrit 29.7 (L) 40.0 - 54.0 %    MCV 93 80 - 100 fL    MCH 30.4 27.0 - 34.0 pg    MCHC 32.7 32.0 - 36.0 g/dL    RDW 19.6 (H) 11.0 - 14.5 %    Platelets 82 (L) 140 - 440 thou/uL    MPV 10.4 8.5 - 12.5 fL    Neutrophils % 82 (H) 50 - 70 %    Lymphocytes % 9 (L) 20 - 40 %    Monocytes % 8 2 - 10 %    Eosinophils % 0 0 - 6 %    Basophils % 0 0 - 2 %    Immature Granulocyte % 2 (H) <=0 %    Neutrophils Absolute 9.7 (H) 2.0 - 7.7 thou/uL    Lymphocytes Absolute 1.0 0.8 - 4.4 thou/uL    Monocytes Absolute 0.9 0.0 - 0.9 thou/uL    Eosinophils Absolute 0.0 0.0 - 0.4 thou/uL    Basophils Absolute 0.0 0.0 - 0.2 thou/uL    Immature Granulocyte Absolute 0.2 (H) <=0.0 thou/uL       Imaging    Nm Pet Ct Skull To Mid Thigh    Result Date:  8/3/2020  EXAM: NM PET CT SKULL TO MID THIGH LOCATION: [facility] DATE/TIME: 8/3/2020 11:48 AM INDICATION: Subsequent treatment planning and restaging for malignant neoplasm of head of pancreas. Status post chemotherapy. Monitor treatment response. COMPARISON: MRI of the abdomen dated 07/06/2020 and CT of the abdomen pelvis dated 06/07/2020, and CT of the thorax dated 05/27/2020 TECHNIQUE: Serum glucose level 104 mg/dL. One hour post intravenous administration of 9.9 mCi F-18 FDG, PET imaging was performed from the skull base to the mid thighs utilizing attenuation correction with concurrent axial CT and PET/CT image fusion. Dose reduction techniques were used. FINDINGS: Centrally necrotic lesion within the pancreatic head (max SUV 5.4) with FDG avid peripancreatic/portacaval (max SUV 6.0) and gastrohepatic (max SUV 3.1) lymph nodes, numerous FDG avid centrally necrotic lesions throughout the hepatic parenchyma  (max SUV 6.9), multiple bilateral pulmonary nodules (max SUV 6.7 in the periphery of the left upper lobe), and numerous osseous lesions are detailed below and suspicious for pancreatic head adenocarcinoma with lymph node, liver, lung, and osseous metastases. -Lytic lesion with cortical thinning/osseous dehiscence of the anterior aspect of the T1 vertebral body (Max SUV 8.7) -Right proximal humeral diaphysis (max SUV 2.3) -Right anterior second rib (max SUV 3.0) -Left posterior second rib (max SUV 3.0) -Right lateral third rib with associated pathologic fracture (max SUV 2.1) -Left lateral eighth rib (max SUV 2.7) -Left anterior iliac wing (max SUV 3.8) -Right greater trochanter (max SUV 2.6), -Left posterior ischium (max SUV 3.7),  -Lytic lesion with cortical thinning/osseous dehiscence in the right proximal femur (max SUV 6.7) -Left proximal femoral diaphysis (max SUV 4.1) Mild senescent intracranial changes. Mild coronary artery calcium. Left renal cyst. Percutaneous gastrojejunostomy tube.  Sigmoid diverticulosis. Pelvic phleboliths. C5-C7 ACDF. Multilevel degenerative changes of the spine.     Findings suspicious for pancreatic head adenocarcinoma with lymph node, liver, lung, and osseous metastases.      Billing  Total face to face time 25 minutes, with 15 minutes of that dedicated to counseling, education or coordination of care.     Signed by: Chelo Peres

## 2021-06-10 NOTE — PROGRESS NOTES
RADIATION ONCOLOGY WEEKLY TREATMENT VISIT NOTE      Assessment / Impression       1. Bone metastases (H)       Tolerating radiation therapy well.  All questions and concerns addressed.    Plan:     Continue radiation treatment as prescribed.    Subjective:      HPI: Jay Oneil is a 59 y.o. male with    1. Bone metastases (H)         The following portions of the patient's history were reviewed and updated as appropriate: allergies, current medications, past family history, past medical history, past social history, past surgical history and problem list.    Assessment                  Body Site: Bone Site: R hip  Stereotactic Radiosurgery: No  Today's Dose: 300  Total Dose for Bone: 3000  Today's Fraction/Total Fraction Bone: 1/10  Neuropathy - motor: 2: Mild objective weakness interfering with function, but not interfering with activites of daily living  Constipation: 0: None  Diarrhea W/O Colostomy: 2: Increase of 4-6 stools/day, or nocturnal stools(Taking Imodium)                                            Sexuality Alteration                 Emotional Alteration Copin: Effective  Comfort Alteration KPS: 70% Cannot do active work, but can care for self  Fatigue (ONS scale) : 6: Moderate Fatigue  Pain Location: whole pelvis  Pain Intensity. Rate degree of pain ranging from 0 (no pain) to 10 (severe pain) : 8  Pain Description: Ache - Muscular type ache  Pain Intervention: 3: Opiods  Effectiveness of pain intervention: 2: Pain relieved 50%   Nutrition Alteration Anorexia: 4: Requiring feeding tube or parental nutrition  Nausea: 1: Able to eat  Vomitin: None  Skin Alteration Skin Sensation: 0: No problem  Skin Reaction: 0: None  AUA Assessment                                  Accompanied by       Objective:     Exam: Examination reviewed no significant changes.    Vitals:    20 1234   BP: 135/76   Pulse: 87   Temp: 98.2  F (36.8  C)   TempSrc: Oral   SpO2: 97%   Weight: 198 lb  3.2 oz (89.9 kg)       Wt Readings from Last 8 Encounters:   08/17/20 198 lb 3.2 oz (89.9 kg)   08/05/20 197 lb 11.2 oz (89.7 kg)   08/03/20 197 lb (89.4 kg)   07/29/20 202 lb (91.6 kg)   07/22/20 204 lb 8 oz (92.8 kg)   07/08/20 202 lb 8 oz (91.9 kg)   06/24/20 214 lb (97.1 kg)   06/12/20 (!) 227 lb 1.6 oz (103 kg)       General: Alert and oriented, in no acute distress  Jay has no Erythema.  Aria chart and setup information reviewed    Suzi Easley MD

## 2021-06-10 NOTE — PROGRESS NOTES
"[facility] Hematology and Oncology Progress Note    Patient: Jay Oneil  MRN: [id number]  Date of Service: 08/05/2020        Reason for Visit    Follow-up of metastatic cancer thought to be of pancreatic origin.    Assessment and Plan      ECOG Performance   ECOG Performance Status: 2    Distress Assessment  Distress Assessment Score: Unable to rate(\"wondering if this is working. I'm hanging in for my family.\"): Please see the discussion below    Pain  Pain Score (Initial OR Reassessment): 6: Please see the discussion below.    Mr. Jay Oneil is a 59 y.o. gentleman who was admitted to the hospital on 5/26/2020 through the ER after he was found to have hypercalcemia with a calcium level 15.7 at [facility] when he had preoperative labs done before a right hip replacement surgery.  He gave a history of increasing acid reflux symptoms, dysphagia and loss of 30 pounds of weight.  He had a past history of osteoarthritis, obstructive sleep apnea, hypertension, hypercholesterolemia, gout, COPD and chronic pain due to spinal degenerative disease.      The CT scan showed thickening of the distal wall of the asparagus, widespread metastatic lesions in the liver, a splenic metastasis, enlarged upper abdominal lymph nodes and multiple bilateral lung mets also.  He had an upper GI endoscopy done with showed extrinsic compression of the distal end of the esophagus but no esophageal mucosal lesion.  This appeared to be a lesion in the wall of the esophagus likely a metastatic site.  Thus this did not appear to be an esophageal cancer.  We obtained a biopsy from a liver metastasis on 5/28/2020 which showed a moderately differentiated adenocarcinoma.  The cells were CK7 positive, CD 19 9 positive and p53 positive.  The pathology impression was that this is metastasis from a pancreaticobiliary primary.  CA-19-9 level was elevated.  He was treated with IV fluids and with Zometa on 5/27/2020 and his calcium " level came down nicely.  He was transferred to [facility] to have a gastrostomy tube placed because of the severe dysphagia.  A GJ tube was placed and an attempt was made to feed him through the jejunostomy for convenience.  Unfortunately that did not agree with him and he had a lot of abdominal cramping, diarrhea etc.  He had to be readmitted to the hospital.  Feeling was changed to be through the G-tube.  He had a Port-A-Cath placed.  He received the first cycle of FOLFOX chemotherapy as an inpatient starting on 06/10/2020.  He was discharged from the hospital after completing the fluorouracil infusion on 6/12/2020.    1.  He has now completed 4 cycles of FOLFOX chemotherapy.  He has had a follow-up PET CT scan done on 8/3/2020.  I reviewed the images and the report with him.  We can see the cancer in the head of the pancreas, multiple lymph nodes, numerous liver mets, lung mets and multiple bone mets.  Correlating to the pain that he has, the brightest of the bone mets are in the bilateral femoral heads.  Compared to the previous CT scans, the liver size has decreased.  The meta stasis in the spleen is not very evident.  The size of the spleen has also decreased.  Thus he has had some response to the 4 cycles of chemotherapy.    However it is notable that his calcium is again at 13.3 today.  He is still losing weight.  I think this is still due to the cancer cachexia and due to humoral hypercalcemia from the metastatic pancreas cancer cancer.  I discussed with him that overall I think he has tolerated FOLFOX chemotherapy well.  I think we have to consider stepping up the level of treatment for him.    I did discuss with him today that he has a cancer that is behaving even more aggressively than usual.  The persistent hypercalcemia worries me.  Even the slow response to chemotherapy worries me.  Even though we cannot cure it, we will go for an aggressive approach to treatment to try to get him as much  time as possible.  He voiced understanding.    2.  I proposed changing his chemotherapy to the FOLFIRINOX regimen.  I went through the possible side effects with him.  I explained to him that he may have more diarrhea and more of cytopenias, including increased risk of infection when we add on Irinotecan to the medications that he is on already.  After he received the Neulasta there is a possibility that he can have more bony aches.  However this regimen is the most active regimen in the clinical trials against metastatic pancreas cancer.  I am hoping that the more aggressive regimen will start turning things around.  After thorough discussion he agreed.  We will proceed with full dose of FOLFIRINOX chemotherapy starting today.    3.  Today he again has significant hypercalcemia with a calcium level of 13.3.  This is in spite of him trying to take and aspirate oral fluids as possible and in spite of him receiving Zometa every 4 weeks.  Unfortunately we cannot give the Zometa more frequently.  At this point I think the only option is to change treatment to Xgeva to treat the hypercalcemia and also the multiple bone mets.  He will be given Xgeva today.  We will give him Xgeva every 4 weeks.  Zometa has been discontinued.  We will also give him another 1 L normal saline bolus today in the infusion room.    4.  He continues to lose weight and has a severe protein calorie malnutrition.  He also has esophageal dysphagia from the metastasis in the lower end of the esophagus.  At this time I am asking him to continue with the tube feeding as best as possible in consultation with a nutritionist.  He can also try to eat as much as possible through the mouth that he cannot tolerate.  Emphasized to him that he should try to push as much fluid as possible, especially with the fluid flushes through the gastrostomy tube.  I reminded him to continue to work with the nutritionist.  The hope is that if the cancer has better response  to chemotherapy, the cachexia will also start improving.    5.  Cancer related pain continues to be a problem: He has bilateral femoral lesions.  On the right side there is some cortical thinning seen.  If this progresses he can have a fracture.  I will refer him to radiation oncology to consider for palliative radiation to bilateral femoral areas.  Hopefully this will also give him some improved relief of pain.  Meanwhile he will continue with the liquid morphine on an as-needed basis for breakthrough pain.  I discussed with him about restarting MS Contin and he was agreeable.  I am starting him on MS Contin 15 mg p.o. twice daily.  I am giving him a prescription for 30 tablets.  We can consider increasing the dose when he comes for the next appointment if needed.    6. At this time opioid-induced constipation does not appear to be a problem.  He is more likely to have diarrhea now that he has been started on the new regimen of chemotherapy.  I discussed with him that if he has significant diarrhea, then he should use Imodium to control the diarrhea.  If he starts feeling that he cannot keep up with that and is becoming dehydrated he should call and let us know.  He may need admission to the hospital.  He voiced understanding.    7.  We will continue to follow the CA-19-9 level with each cycle of chemotherapy.  If symptomatically he is doing okay, plan for repeating a PET CT scan or a CT scan after 4 cycles of FOLFIRINOX chemotherapy.    8.  Follow-up: Return to clinic with MD or NP in 2 weeks.  Labs according to treatment plan and appointment for cycle #2 of FOLFIRINOX chemotherapy.    Time spend >40 minutes face to face with the patient. More than 50 % in counseling and coordination of care.      Problem List    1. Malignant neoplasm of head of pancreas (H)  prochlorperazine (COMPAZINE) 10 MG tablet    dexAMETHasone (DECADRON) 4 MG tablet    lidocaine-prilocaine (EMLA) cream    CC OFFICE VISIT LONG    Infusion  Appointment    NURSE VISIT    CC OFFICE VISIT LONG    Infusion Appointment    NURSE VISIT    CC OFFICE VISIT LONG    morphine (MS CONTIN) 15 MG 12 hr tablet    Ambulatory referral to Radiation Therapy    DISCONTINUED: sodium chloride 0.9% 1,000 mL    DISCONTINUED: sodium chloride 0.9% 250 mL infusion    DISCONTINUED: palonosetron injection 0.25 mg (ALOXI)    DISCONTINUED: fosaprepitant 150 mg, dexAMETHasone 12 mg in sodium chloride 0.9% 150 mL    DISCONTINUED: atropine injection 0.5 mg    DISCONTINUED: oxaliplatin 180 mg in dextrose 5% 250 mL (ELOXATIN)    DISCONTINUED: dextrose 5% 500 mL infusion    DISCONTINUED: irinotecan 380 mg in dextrose 5% 500 mL (CAMPTOSAR)    DISCONTINUED: leucovorin 850 mg in dextrose 5% 250 mL (WELLCOVORIN)    DISCONTINUED: fluorouraciL injection 850 mg (ADRUCIL)    DISCONTINUED: fluorouraciL 2,400 mg/m2 = 5,000 mg in sodium chloride 0.9% 242 mL (ADRUCIL)   2. Liver metastases (H)     3. Malignant neoplasm metastatic to both lungs (H)     4. 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 Bone metastases (H)  Ambulatory referral to Radiation Therapy   6. Hypercalcemia of malignancy  DISCONTINUED: sodium chloride 0.9% 1,000 mL   7. Severe protein-calorie malnutrition ([place]: less than 60% of standard weight) (H)     8. Esophageal dysphagia     9. Encounter for antineoplastic chemotherapy  prochlorperazine (COMPAZINE) 10 MG tablet    dexAMETHasone (DECADRON) 4 MG tablet    lidocaine-prilocaine (EMLA) cream    CC OFFICE VISIT LONG    Infusion Appointment    NURSE VISIT    CC OFFICE VISIT LONG    Infusion Appointment    NURSE VISIT    CC OFFICE VISIT LONG    DISCONTINUED: sodium chloride 0.9% 250 mL infusion    DISCONTINUED: palonosetron injection 0.25 mg (ALOXI)    DISCONTINUED: fosaprepitant 150 mg, dexAMETHasone 12 mg in sodium chloride 0.9% 150 mL    DISCONTINUED: atropine injection 0.5 mg    DISCONTINUED: oxaliplatin 180 mg in dextrose 5% 250 mL (ELOXATIN)    DISCONTINUED: dextrose 5% 500 mL infusion     DISCONTINUED: irinotecan 380 mg in dextrose 5% 500 mL (CAMPTOSAR)    DISCONTINUED: leucovorin 850 mg in dextrose 5% 250 mL (WELLCOVORIN)    DISCONTINUED: fluorouraciL injection 850 mg (ADRUCIL)    DISCONTINUED: fluorouraciL 2,400 mg/m2 = 5,000 mg in sodium chloride 0.9% 242 mL (ADRUCIL)   10. Cancer associated pain  morphine (MS CONTIN) 15 MG 12 hr tablet    Ambulatory referral to Radiation Therapy        CC: Jose Brewer MD    ______________________________________________________________________________    History of Present Illness    Mr. Jay Oneil is here for follow-up alone.  His wife could not be on the speaker phone today because she is working.    He states that he feels that he tolerated cycle #4 of chemotherapy well but he feels that he had significant side effects from Zometa.  He feels that he had similar side effects the last time he got Zometa also.  He states that the symptoms started about 2 days after when he had headaches, body aches, diarrhea and frequent urination.  This is to the extent that he came to the ER.    He states that he is taking continuous feeding through the gastrostomy part of the GJ tube.  He tried the bolus feeding but felt that it was filling him up too much.  He has also been taking frequent water flushes.  He has a feeling of bloating.  He states that he feels up so much that he does not think that he can take anything more through the enteral route.  He states that he is now able to swallow some liquids and he is also able to swallow pills now.    In spite of trying to eat well he is losing weight.  He has lost 7 pounds in the last 2 weeks.    He continues to have pain.  The main pain is in the bilateral hip areas.  He also has some back pain.  He feels that the liquid morphine is helping but it is not enough.  Constipation is not an issue.    He says that he continues to feel very fatigued and he feels wobbly.    No fevers or night sweats.  No lumps or bumps  anywhere.  No unusual headaches.  No eyesight problems.  No mouth sores.  No swallowing difficulty.  No nausea or vomiting.  No chest pain.  No cough.  No diarrhea no.  No constipation no.  No difficulty with urination no.  No skin rashes.  No numbness or tingling.    Please see below.  A 14 point review of system is otherwise completely negative.    Pain Status  Currently in Pain: Yes    Review of Systems    Constitutional  Constitutional (WDL): Exceptions to WDL  Fatigue: Fatigue relieved by rest  Fever: None  Chills: None  Weight Gain: 5 - <10% from baseline  Weight Loss: None  Neurosensory  Neurosensory (WDL): Exceptions to WDL  Peripheral Motor Neuropathy: None  Ataxia: Moderate symptoms, limiting instrumental ADL  Peripheral Sensory Neuropathy: Asymptomatic, loss of deep tendon reflexes or paresthesia  Confusion: Mild disorientation(foggy)  Syncope: None  Cardiovascular  Cardiovascular (WDL): Exceptions to WDL  Palpitations: None  Edema: Yes  Edema Trunk: Readily apparent obscuration of anatomic architecture, obliteration of skin folds, readily apparent deviation from normal anatomic contour, limiting instrumental ADL  Phlebitis: None  Superficial thrombophlebitis: None  Pulmonary  Respiratory (WDL): Exceptions to WDL  Cough: None  Dyspnea: Shortness of breath with moderate exertion  Hypoxia: None  Gastrointestinal  Gastrointestinal (WDL): Exceptions to WDL  Anorexia: None(G tube feeding 24 hour)  Constipation: Occasional or intermittent symptoms, occasional use of stool softeners, laxatives, dietary modification, or enema  Diarrhea: None  Dysphagia: Life-threatening consequences, urgent intervention indicated  Esophagitis: Severely altered eating/swallowing, tube feeding, TPN or hospitalization indicated  Nausea: None  Vomiting: None  Dysgeusia: Altered taste with change in diet (e.g., oral supplements), noxious or unpleasant taste, loss of taste  Dry Mouth: Symptomatic (e.g., dry or thick saliva) without  "significant dietary alteration, unstimulated saliva flow >0.2 ml/min  Genitourinary  Genitourinary (WDL): Exceptions to WDL(slow stream, urgency)  Urinary Frequency: Limiting instrumental ADL, medical management indicated  Urinary Retention: Placement of urinary, suprapubic or intermittent catheter placement indicated, medication indicated  Urinary Tract Pain: None  Integumentary  Integumentary (WDL): All integumentary elements are within defined limits  Patient Coping  Patient Coping: Open/discussion  Distress Assessment  Distress Assessment Score: Unable to rate(\"wondering if this is working. I'm hanging in for my family.\")  Accompanied by       Past History  Past Medical History:   Diagnosis Date     Chronic pain      COPD (chronic obstructive pulmonary disease) (H)      Gout      Hypercholesterolemia      Hypertension      Low back pain with sciatica      Obstructive sleep apnea 03/23/2008     Osteoarthritis          Past Surgical History:   Procedure Laterality Date     CERVICAL FUSION  07/09/2018    C5-C7     COLONOSCOPY  2001     COLONOSCOPY  09/2006     COLONOSCOPY  03/26/2010     COLONOSCOPY  10/23/2015     ESOPHAGOGASTRODUODENOSCOPY  05/27/2020     IR GJ TUBE INSERTION  6/1/2020     IR PORT PLACEMENT >5 YEARS  6/9/2020     LUMBAR LAMINECTOMY  1985, 2002    L4- L5 level.  X2.     [place] ESOPHAGOGASTRODUODENOSCOPY TRANSORAL DIAGNOSTIC N/A 5/27/2020    Procedure: ESOPHAGOGASTRODUODENOSCOPY (EGD);  Surgeon: Yasmany Petit MD;  Location: [facility];  Service: Gastroenterology     SHOULDER ARTHROSCOPY W/ ACROMIAL REPAIR Right 01/14/2016     TONSILLECTOMY  1964       Physical Exam    Recent Vitals 8/5/2020   Height -   Weight 197 lbs 11 oz   BSA (m2) 2.09 m2   /81   Pulse 95   Temp 97.7   Temp src 1   SpO2 97   Some recent data might be hidden       GENERAL: Alert and oriented to time place and person. Seated comfortably. In no distress.  Slightly anxious.  He appears comfortable at rest.  However " he has lost a bit more weight.    HEAD: Atraumatic and normocephalic.    EYES: CHRISTEN, EOMI.  No pallor.  No icterus.    Oral cavity: no mucosal lesion or tonsillar enlargement.    NECK: supple. JVP normal.  No thyroid enlargement.    LYMPH NODES: No palpable, cervical, axillary or inguinal lymphadenopathy.    CHEST: clear to auscultation bilaterally.  Resonant to percussion throughout bilaterally.  Symmetrical breath movements bilaterally.    Port-a-cath over the right chest looks unremarkable.    CVS: S1 and S2 are heard. Regular rate and rhythm.  No murmur or gallop or rub heard.  No peripheral edema.    ABDOMEN: Soft. Not tender. Not distended.  Somewhat obese.  A firm liver is still palpable about 7 cm below the costal margin in the midclavicular line.  The GJ tube is in the left upper quadrant.  No other mass palpable.  Bowel sounds heard.    EXTREMITIES: Warm.    SKIN: no rash, or bruising or purpura.  Male pattern baldness          Lab Results    Recent Results (from the past 168 hour(s))   Urinalysis-UC if Indicated   Result Value Ref Range    Color, UA Precious (!) Colorless, Yellow, Straw, Light Yellow    Clarity, UA Clear Clear    Glucose, UA Negative Negative    Bilirubin, UA Negative Negative    Ketones, UA Negative Negative    Specific Gravity, UA 1.030 1.001 - 1.030    Blood, UA Negative Negative    pH, UA 5.0 4.5 - 8.0    Protein, UA Negative Negative mg/dL    Urobilinogen, UA <2.0 E.U./dL <2.0 E.U./dL, 2.0 E.U./dL    Nitrite, UA Negative Negative    Leukocytes, UA Negative Negative   Basic Metabolic Panel   Result Value Ref Range    Sodium 134 (L) 136 - 145 mmol/L    Potassium 4.4 3.5 - 5.0 mmol/L    Chloride 105 98 - 107 mmol/L    CO2 26 22 - 31 mmol/L    Anion Gap, Calculation 3 (L) 5 - 18 mmol/L    Glucose 107 70 - 125 mg/dL    Calcium 12.6 (HH) 8.5 - 10.5 mg/dL    BUN 16 8 - 22 mg/dL    Creatinine 0.62 (L) 0.70 - 1.30 mg/dL    GFR MDRD Af Amer >60 >60 mL/min/1.73m2    GFR MDRD Non Af Amer >60 >60  mL/min/1.73m2   Hepatic Profile   Result Value Ref Range    Bilirubin, Total 0.6 0.0 - 1.0 mg/dL    Bilirubin, Direct 0.3 <=0.5 mg/dL    Protein, Total 7.3 6.0 - 8.0 g/dL    Albumin 3.1 (L) 3.5 - 5.0 g/dL    Alkaline Phosphatase 317 (H) 45 - 120 U/L    AST 50 (H) 0 - 40 U/L    ALT 20 0 - 45 U/L   Lipase   Result Value Ref Range    Lipase 46 0 - 52 U/L   Magnesium   Result Value Ref Range    Magnesium 2.3 1.8 - 2.6 mg/dL   HM1 (CBC with Diff)   Result Value Ref Range    WBC 4.0 4.0 - 11.0 thou/uL    RBC 3.27 (L) 4.40 - 6.20 mill/uL    Hemoglobin 9.7 (L) 14.0 - 18.0 g/dL    Hematocrit 29.2 (L) 40.0 - 54.0 %    MCV 89 80 - 100 fL    MCH 29.7 27.0 - 34.0 pg    MCHC 33.2 32.0 - 36.0 g/dL    RDW 15.9 (H) 11.0 - 14.5 %    Platelets 111 (L) 140 - 440 thou/uL    MPV 10.9 8.5 - 12.5 fL    Neutrophils % 57 50 - 70 %    Lymphocytes % 29 20 - 40 %    Monocytes % 12 (H) 2 - 10 %    Eosinophils % 1 0 - 6 %    Basophils % 0 0 - 2 %    Neutrophils Absolute 2.3 2.0 - 7.7 thou/uL    Lymphocytes Absolute 1.2 0.8 - 4.4 thou/uL    Monocytes Absolute 0.5 0.0 - 0.9 thou/uL    Eosinophils Absolute 0.0 0.0 - 0.4 thou/uL    Basophils Absolute 0.0 0.0 - 0.2 thou/uL   POCT Glucose    Specimen: Capillary; Blood   Result Value Ref Range    Glucose 104 70 - 139 mg/dL   Magnesium   Result Value Ref Range    Magnesium 2.2 1.8 - 2.6 mg/dL   Comprehensive Metabolic Panel   Result Value Ref Range    Sodium 138 136 - 145 mmol/L    Potassium 4.3 3.5 - 5.0 mmol/L    Chloride 106 98 - 107 mmol/L    CO2 25 22 - 31 mmol/L    Anion Gap, Calculation 7 5 - 18 mmol/L    Glucose 129 (H) 70 - 125 mg/dL    BUN 15 8 - 22 mg/dL    Creatinine 0.63 (L) 0.70 - 1.30 mg/dL    GFR MDRD Af Amer >60 >60 mL/min/1.73m2    GFR MDRD Non Af Amer >60 >60 mL/min/1.73m2    Bilirubin, Total 0.7 0.0 - 1.0 mg/dL    Calcium 13.3 (HH) 8.5 - 10.5 mg/dL    Protein, Total 7.6 6.0 - 8.0 g/dL    Albumin 3.3 (L) 3.5 - 5.0 g/dL    Alkaline Phosphatase 296 (H) 45 - 120 U/L    AST 39 0 - 40 U/L     ALT 12 0 - 45 U/L   HM1 (CBC with Diff)   Result Value Ref Range    WBC 5.5 4.0 - 11.0 thou/uL    RBC 3.58 (L) 4.40 - 6.20 mill/uL    Hemoglobin 10.5 (L) 14.0 - 18.0 g/dL    Hematocrit 32.4 (L) 40.0 - 54.0 %    MCV 91 80 - 100 fL    MCH 29.3 27.0 - 34.0 pg    MCHC 32.4 32.0 - 36.0 g/dL    RDW 17.5 (H) 11.0 - 14.5 %    Platelets 117 (L) 140 - 440 thou/uL    MPV 10.8 8.5 - 12.5 fL    Neutrophils % 64 50 - 70 %    Lymphocytes % 24 20 - 40 %    Monocytes % 11 (H) 2 - 10 %    Eosinophils % 0 0 - 6 %    Basophils % 0 0 - 2 %    Neutrophils Absolute 3.5 2.0 - 7.7 thou/uL    Lymphocytes Absolute 1.3 0.8 - 4.4 thou/uL    Monocytes Absolute 0.6 0.0 - 0.9 thou/uL    Eosinophils Absolute 0.0 0.0 - 0.4 thou/uL    Basophils Absolute 0.0 0.0 - 0.2 thou/uL       Imaging    Nm Pet Ct Skull To Mid Thigh    Result Date: 8/3/2020  EXAM: NM PET CT SKULL TO MID THIGH LOCATION: [facility] DATE/TIME: 8/3/2020 11:48 AM INDICATION: Subsequent treatment planning and restaging for malignant neoplasm of head of pancreas. Status post chemotherapy. Monitor treatment response. COMPARISON: MRI of the abdomen dated 07/06/2020 and CT of the abdomen pelvis dated 06/07/2020, and CT of the thorax dated 05/27/2020 TECHNIQUE: Serum glucose level 104 mg/dL. One hour post intravenous administration of 9.9 mCi F-18 FDG, PET imaging was performed from the skull base to the mid thighs utilizing attenuation correction with concurrent axial CT and PET/CT image fusion. Dose reduction techniques were used. FINDINGS: Centrally necrotic lesion within the pancreatic head (max SUV 5.4) with FDG avid peripancreatic/portacaval (max SUV 6.0) and gastrohepatic (max SUV 3.1) lymph nodes, numerous FDG avid centrally necrotic lesions throughout the hepatic parenchyma  (max SUV 6.9), multiple bilateral pulmonary nodules (max SUV 6.7 in the periphery of the left upper lobe), and numerous osseous lesions are detailed below and suspicious for pancreatic head  adenocarcinoma with lymph node, liver, lung, and osseous metastases. -Lytic lesion with cortical thinning/osseous dehiscence of the anterior aspect of the T1 vertebral body (Max SUV 8.7) -Right proximal humeral diaphysis (max SUV 2.3) -Right anterior second rib (max SUV 3.0) -Left posterior second rib (max SUV 3.0) -Right lateral third rib with associated pathologic fracture (max SUV 2.1) -Left lateral eighth rib (max SUV 2.7) -Left anterior iliac wing (max SUV 3.8) -Right greater trochanter (max SUV 2.6), -Left posterior ischium (max SUV 3.7),  -Lytic lesion with cortical thinning/osseous dehiscence in the right proximal femur (max SUV 6.7) -Left proximal femoral diaphysis (max SUV 4.1) Mild senescent intracranial changes. Mild coronary artery calcium. Left renal cyst. Percutaneous gastrojejunostomy tube. Sigmoid diverticulosis. Pelvic phleboliths. C5-C7 ACDF. Multilevel degenerative changes of the spine.     Findings suspicious for pancreatic head adenocarcinoma with lymph node, liver, lung, and osseous metastases.        Signed by: Denise Jon MD

## 2021-06-10 NOTE — TELEPHONE ENCOUNTER
"Wife called and LM, said since patient had Zometa, he is weak, has had diarrhea, and flushing.  Attempted to reach patient, went to .  Called and spoke with wife, she said his diarrhea started probably Sat evening. Continued and was bad last night. He is very weak. Had facial flushing. Feels cold but not shaking chills. Tailbone pain. He is doing free water flushes via GJ tube, wife says \"a lot\", does 180 cc each flush. She says she thought his prostate must be large because he says his urine \"dribbles out.\"  Unsure if he is lightheadedness, dizzy, having palpitations.   Has not taken any imodium.  Advised he will likely need to be seen and evaluated, will need CMP and possible fluids. Will discuss with Dr. Jon.   Barbra Tinoco RN    "

## 2021-06-10 NOTE — TELEPHONE ENCOUNTER
"Barbra, patient's spouse returned call. I gave her update as outlined below. She states the swelling is slightly better, and blotches are less. Johnie is weak and his legs feels like \"jello.\" She is going to help him take a shower, and plan to go to radiation today,. I explained he needs to be evaluated at radiation, and/or consider ED per Dr. Jon. She expressed understanding to above. Britt Pappas    "

## 2021-06-10 NOTE — TELEPHONE ENCOUNTER
PA initiated for Morphine tabs.     Medication Prior Authorization    Start Date:  8/6/20  Type:  New  Urgency:  Urgent  Med Type:  Non-Specialty  Insurance Info:  [employer] ([facility]) - Phone [phone] Fax [phone]  Method:  ePA  Reference #:  Key: [id number] - PA Case ID: [id number]  ___________________________________________________________________________________________________________________:

## 2021-06-10 NOTE — TELEPHONE ENCOUNTER
Please tell the patient's wife, that he has been given enough of the prescription to last 15 days when I last saw him. I wanted him re-evaluated about pain when he comes in next. He has an appointment on 8/19/20. The refill can be done on that day adjusting dose depending on what the patient feels about pain. Please also tell her that I would want the patient to have refills in face to face visits as far as possible and I would like to hear from him firectly- especially about pain meds.    Denise Jon MD

## 2021-06-10 NOTE — PROGRESS NOTES
Pt arrived to infusion clinic with black chemo bag wrapped up in two plastic grocery bags. Pt stated bag was wet. Writer untied bags and found the 5FU chemo pump was leaking and had completely saturated black bag. It appears as though pt did not receive 5FU dose over the last two days. Dr. Jon notified, no change in plan of care today. Chemo pump DC'd. Port flushed with Saline and Heparin, de-accessed, band-aid applied. Neulasta injection given to RLQ of ABD, band-aid applied. Pt ambulated out of infusion clinic independently.

## 2021-06-10 NOTE — TELEPHONE ENCOUNTER
Patient's spouse called, he showered this AM and forgot to cover the portacath dressing. It is wet.   FOLFIRINOX pump running.   Patient will come in to have dressing changed.    Barbra iTnoco RN

## 2021-06-10 NOTE — TELEPHONE ENCOUNTER
Received call from patients wife, Barbra, concerned that patient is continuing to lose weight and they would like to speak with a dietician about changes to his tube feeding to see if this would help stabilize his weight.  Told wife that I would put a message to both EVELYN Wall dietician and samuel Moncada dietician to see which would be available to help him with tube feeding adjustments.  Wife appreciative of message being sent and states they have a follow up visit next week with Dr. Jon.  Email sent.

## 2021-06-10 NOTE — PROCEDURES
Procedures  SIMULATION NOTE:    DIAGNOSIS:  Stage IV pancreatic cancer with clinically symptomatic right hip metastasis.      INDICATION:  Palliative radiation therapy is recommended for patient for local control, symptomatic relief and prevention of pathologic fracture.      CONSENT:  The possible risks and side effects of radiation therapy have been discussed with the patient in detail and at great length.  Questions were answered to patient's satisfaction.  Written consent was obtained.      SIMULATION:  The patient is in the supine position with a headrest and under knee cushion to help keep same position during daily radiation therapy.  Tentative isocenter is set up in the center of the pelvic region.  We will acquire CT information to help us better locate the target and design the radiation therapy field.      BLOCKS:  Custom blocks will be drawn to minimize radiation to normal tissues and to protect normal organs, including, but not limited to, urinary bladder, rectum, bowels, bone and soft tissue.      DOSAGE:  I plan to give him radiation therapy at 300 cGy each fraction to a total of 3000 cGy in 10 treatments targeted to the right hip region only.      Suzi Easley MD, PhD  Department of Radiation Oncology   [facility]  Tel: [phone]  Page: [phone]    [facility]  [address] [place] [address]     [place] [id number] [facility]   [place] [address]

## 2021-06-10 NOTE — TELEPHONE ENCOUNTER
Spoke with patient, he says his main concern is weakness and inability to urinate. He said he believes he needs to be catheterized. He also admits to being weak. Says he is now having more gas then diarrhea. He also mentions having tailbone pain as he has to sleep with HOB elevated. He has chronic hip pain Rt> Lt.   Informed patient of ER recommendation and he verbalizes agreement. His wife will drive him.  Report called at 11:25 AM to [facility] ER physician.   Barbra Tinoco RN

## 2021-06-10 NOTE — TELEPHONE ENCOUNTER
Pt's wife called cancer care clinic this am to make sure Morhpine Rx was sent. Confirmed that liquid morphine request was sent by Dr. Jon yesterday. Today Barbra states Johnie also needs a refill on his MS Contin as he will be out this Saturday.   Per Dr. Jon- see note.   Barbra was called and informed of this. It was reiterated that pt should be taking MS Contin two times a day. Pt's wife verbalized understanding of this.

## 2021-06-10 NOTE — PATIENT INSTRUCTIONS - HE
Nm Pet Ct Skull To Mid Thigh    Result Date: 8/3/2020  EXAM: NM PET CT SKULL TO MID THIGH LOCATION: [facility] DATE/TIME: 8/3/2020 11:48 AM INDICATION: Subsequent treatment planning and restaging for malignant neoplasm of head of pancreas. Status post chemotherapy. Monitor treatment response. COMPARISON: MRI of the abdomen dated 07/06/2020 and CT of the abdomen pelvis dated 06/07/2020, and CT of the thorax dated 05/27/2020 TECHNIQUE: Serum glucose level 104 mg/dL. One hour post intravenous administration of 9.9 mCi F-18 FDG, PET imaging was performed from the skull base to the mid thighs utilizing attenuation correction with concurrent axial CT and PET/CT image fusion. Dose reduction techniques were used. FINDINGS: Centrally necrotic lesion within the pancreatic head (max SUV 5.4) with FDG avid peripancreatic/portacaval (max SUV 6.0) and gastrohepatic (max SUV 3.1) lymph nodes, numerous FDG avid centrally necrotic lesions throughout the hepatic parenchyma  (max SUV 6.9), multiple bilateral pulmonary nodules (max SUV 6.7 in the periphery of the left upper lobe), and numerous osseous lesions are detailed below and suspicious for pancreatic head adenocarcinoma with lymph node, liver, lung, and osseous metastases. -Lytic lesion with cortical thinning/osseous dehiscence of the anterior aspect of the T1 vertebral body (Max SUV 8.7) -Right proximal humeral diaphysis (max SUV 2.3) -Right anterior second rib (max SUV 3.0) -Left posterior second rib (max SUV 3.0) -Right lateral third rib with associated pathologic fracture (max SUV 2.1) -Left lateral eighth rib (max SUV 2.7) -Left anterior iliac wing (max SUV 3.8) -Right greater trochanter (max SUV 2.6), -Left posterior ischium (max SUV 3.7),  -Lytic lesion with cortical thinning/osseous dehiscence in the right proximal femur (max SUV 6.7) -Left proximal femoral diaphysis (max SUV 4.1) Mild senescent intracranial changes. Mild coronary artery calcium. Left renal  cyst. Percutaneous gastrojejunostomy tube. Sigmoid diverticulosis. Pelvic phleboliths. C5-C7 ACDF. Multilevel degenerative changes of the spine.     Findings suspicious for pancreatic head adenocarcinoma with lymph node, liver, lung, and osseous metastases.

## 2021-06-10 NOTE — TELEPHONE ENCOUNTER
Spouse called, wants to be proactive in finding pharmacy that will have morphine 10 mg/5 ml. Should be due for refill on 7/30.   RN called patient preferred pharmacy, [facility]. Spoke directly to pharmacist. She will order requested medication in advance, should have in store by 7/29 or 7/30.  Patient aware. He will call us when RX is needed.  Barbra Tinoco RN

## 2021-06-10 NOTE — CONSULTS
Consults   [facility] Radiation Oncology Consult Note     Patient: Jay Oneil  MRN: [id number]  Date of Service: 08/13/2020          Denise Jon MD  [address]  [address]  [place] [id number]       Dear Dr. Jon:    Thank you very much for referring this patient for consideration of radiotherapy. As you know Mr. Oneil is a 59 y.o. male with a diagnosis of stage IV pancreatic cancer with clinically symptomatic right hip metastasis.  The patient is referred to radiation oncology for evaluation and consideration of palliative radiation therapy for local control, symptom relief, and prevention of pathologic fracture.    HISTORY OF PRESENT ILLNESS:   Mr. Oneil is a 59 y.o. male who was admitted to the hospital through the ER on 5/26/2020 after he was found to have hypercalcemia with a calcium of 15.7 at [facility] when he had preoperative labs done before a right hip replacement surgery.  He has a history of having increased acid reflux symptoms over the last few months, a feeling of food sticking in the lower chest in the last 6 to 8 weeks, loss of 30 pounds of weight, constipation, polyuria and fatigue.     The CT scan done at admission showed thickening of the distal wall of the esophagus that was concerning for a neoplasm, widespread lesions in the liver which appeared to be a liver metastasis and at least 1 splenic metastasis, enlarged upper abdominal lymph nodes and multiple bilateral pulmonary nodules which appear to be metastatic disease.  The CT scan of the chest that was done yesterday again shows bilateral lung mets.  However there is no mass that appeared to be pressing on the esophagus.  He was also found to have bone mets the T1 vertebral body and the right third rib.  The patient underwent CT-guided needle biopsy of right lobe liver mass on 5/28/2020 with pathology showed moderately differentiated adenocarcinoma consistent with pancreaticobiliary origin.    Patient  received chemotherapy with FOLFOX under supervision of Dr. Jon, med oncology.  He received 4 cycles of chemotherapy with partial response.  The restaging PET CT scan on 8/3/2020 showed pancreatic cancer with multiple lymph nodes, liver, lung, and bone metastases.  The patient did have significant pain involving the right hip region ranking 9/10 scale without pain medication.  This has been symptom reduced to 5/10 scale with MS Contin.  Patient is referred to radiation oncology for evaluation and consideration of possible PET radiation therapy for local control, symptom relief, and the prevention of pathologic fracture.    CHEMOTHERAPY HISTORY: Concurrent Chemotherapy: No    RADIATION THERAPY HISTORY: Prior Radiation: No    IMPLANTED CARDIAC DEVICE: none     Current Outpatient Medications   Medication Sig Dispense Refill     acetaminophen (TYLENOL) 650 mg/20.3 mL Soln 20.3 mL (650 mg total) by Enteral Tube route 4 (four) times a day.  0     albuterol (PROAIR HFA;PROVENTIL HFA;VENTOLIN HFA) 90 mcg/actuation inhaler Inhale 1-2 puffs 4 (four) times a day as needed for wheezing.        celecoxib (CELEBREX) 200 MG capsule 200 mg by Enteral Tube route daily with breakfast.        dexAMETHasone (DECADRON) 4 MG tablet Take 8mg every 12 hours for 3 days, starting the day after chemotherapy.. 72 tablet 0     diclofenac sodium (VOLTAREN) 1 % Gel Lower extremities: 4gm to affected area four times a day, Upper extremities: 2gm to the affected area four times a day 50 g 0     fluticasone propionate (FLONASE) 50 mcg/actuation nasal spray Apply 2 sprays into each nostril daily.       gabapentin (NEURONTIN) 250 mg/5 mL solution TAKE 2.5 ML BY MOUTH AT BEDTIME 75 mL 0     lansoprazole (PREVACID) 15 MG capsule 1 capsule (15 mg total) by G-tube route daily. 30 capsule 3     lidocaine-prilocaine (EMLA) cream Apply to port site 1 hour before port needle access. 15 g 1     magnesium hydroxide (MAGNESIUM HYDROXIDE) 400 mg/5 mL Susp  suspension 30 mL by Enteral Tube route daily as needed (For constipation.). 473 mL 3     morphine (MS CONTIN) 15 MG 12 hr tablet Take 1 tablet (15 mg total) by mouth 2 (two) times a day for 15 days. 30 tablet 0     morphine 10 mg/5 mL solution Take 2.5 mL (5 mg total) by mouth every 4 (four) hours as needed for pain. 210 mL 0     polyethylene glycol (MIRALAX) 17 gram packet 17 g by Enteral Tube route daily as needed.       prochlorperazine (COMPAZINE) 10 MG tablet 1 tablet (10 mg total) by G-tube route every 6 (six) hours as needed for nausea. 50 tablet 3     prochlorperazine (COMPAZINE) 10 MG tablet Take 1 tablet (10 mg total) by mouth every 6 (six) hours as needed (For breakthrough nausea/vomiting). 30 tablet 1     sennosides (SENNOSIDES) 8.8 mg/5 mL Syrp syrup 10 mL (17.6 mg total) by Enteral Tube route daily. 236 mL 3     sodium bicarbonate 325 MG tablet 1 tablet (325 mg total) by Enteral Tube route as needed (For clogged feeding tube). OTC product 30 tablet 0     No current facility-administered medications for this visit.      Past Medical History:   Diagnosis Date     Chronic pain      COPD (chronic obstructive pulmonary disease) (H)      Gout      Hypercholesterolemia      Hypertension      Low back pain with sciatica      Obstructive sleep apnea 03/23/2008     Osteoarthritis      Past Surgical History:   Procedure Laterality Date     CERVICAL FUSION  07/09/2018    C5-C7     COLONOSCOPY  2001     COLONOSCOPY  09/2006     COLONOSCOPY  03/26/2010     COLONOSCOPY  10/23/2015     ESOPHAGOGASTRODUODENOSCOPY  05/27/2020     IR GJ TUBE INSERTION  6/1/2020     IR PORT PLACEMENT >5 YEARS  6/9/2020     LUMBAR LAMINECTOMY  1985, 2002    L4- L5 level.  X2.     RI ESOPHAGOGASTRODUODENOSCOPY TRANSORAL DIAGNOSTIC N/A 5/27/2020    Procedure: ESOPHAGOGASTRODUODENOSCOPY (EGD);  Surgeon: Yasmany Petit MD;  Location: [facility];  Service: Gastroenterology     SHOULDER ARTHROSCOPY W/ ACROMIAL REPAIR Right  2016     TONSILLECTOMY  1964     Tramadol  Family History   Problem Relation Age of Onset     Lupus Mother      Hypertension Mother      Dyslipidemia Mother      Colon cancer Father      Diabetes type II Father      Obesity Sister      Obesity Brother      No Medical Problems Son      No Medical Problems Daughter      Social History     Socioeconomic History     Marital status:      Spouse name: Not on file     Number of children: Not on file     Years of education: Not on file     Highest education level: Not on file   Occupational History     Occupation: Retired.     Comment: was an . Retired .   Social Needs     Financial resource strain: Not on file     Food insecurity     Worry: Not on file     Inability: Not on file     Transportation needs     Medical: Not on file     Non-medical: Not on file   Tobacco Use     Smoking status: Former Smoker     Packs/day: 1.00     Years: 20.00     Pack years: 20.00     Last attempt to quit: 2000     Years since quittin.2     Smokeless tobacco: Never Used   Substance and Sexual Activity     Alcohol use: Not Currently     Frequency: Never     Drug use: Yes     Frequency: 42.0 times per week     Types: Oxycodone     Comment: pain clinic contract     Sexual activity: Not on file   Lifestyle     Physical activity     Days per week: Not on file     Minutes per session: Not on file     Stress: Not on file   Relationships     Social connections     Talks on phone: Not on file     Gets together: Not on file     Attends [employer] service: Not on file     Active member of club or organization: Not on file     Attends meetings of clubs or organizations: Not on file     Relationship status: Not on file     Intimate partner violence     Fear of current or ex partner: Not on file     Emotionally abused: Not on file     Physically abused: Not on file     Forced sexual activity: Not on file   Other Topics Concern     Not on file   Social History  Narrative     Not on file        Review of Systems:      General  Constitutional (WDL): Exceptions to WDL  Fatigue: Fatigue not relieved by rest - Limiting instrumental ADL  EENT  Eye Disorder (WDL): All eye disorder elements are within defined limits  Ear Disorder (WDL): All ear disorder elements are within defined limits  Respiratory   Respiratory (WDL): Exceptions to WDL  Dyspnea: Shortness of breath with moderate exertion  Cardiovascular  Cardiovascular (WDL): Exceptions to WDL  Edema: Yes  Edema Limbs: 5 - 10% inter-limb discrepancy in volume or circumference at point of greatest visible difference, swelling or obscuration of anatomic architecture on close inspection  Edema Trunk: Swelling or obscuration of anatomic architecture on close inspection  Endocrine     Gastrointestinal  Gastrointestinal (WDL): Exceptions to WDL  Anorexia: Loss of appetite without alteration in eating habits  Dysgeusia: Altered taste with change in diet (e.g., oral supplements), noxious or unpleasant taste, loss of taste  Dysphagia: Life-threatening consequences, urgent intervention indicated  Esophagitis: Severely altered eating/swallowing, tube feeding, TPN or hospitalization indicated  Constipation: Occasional or intermittent symptoms, occasional use of stool softeners, laxatives, dietary modification, or enema  Musculoskeletal  Musculoskeletal and Connetive Tissue Disorders (WDL): Exceptions to WDL  Arthralgia: Moderate pain, limiting instrumental ADL  Bone Pain: Moderate pain, limiting instrumental ADL  Muscle Weakness : Symptomatic, perceived by patient but not evident on physical exam  Myalgia: Mild pain  Integumentary               Integumentary (WDL): All integumentary elements are within defined limits  Neurological  Neurosensory (WDL): Exceptions to WDL  Ataxia: Moderate symptoms, limiting instrumental ADL  Peripheral Sensory Neuropathy: Moderate symptoms, limiting instrumental ADL  Psychological/Emotional   Patient Coping:  Accepting;Open/discussion  Hematological/Lymphatic  Lymph (WDL): Exceptions to WDL  Lymphedema: Marked discoloration, leathery skin texture, papillary formation, limiting instrumental ADL  Dermatologic     Genitourinary/Reproductive  Genitourinary (WDL): Exceptions to WDL  Urinary Frequency: Limiting instrumental ADL, medical management indicated  Urinary Retention: Placement of urinary, suprapubic or intermittent catheter placement indicated, medication indicated  Reproductive     Pain              Currently in Pain: Yes  Pain Score (Initial OR Reassessment): 4  Pain Frequency: Constant/continuous  Location: hips, back, mild in abdomen  Pain Intervention(s): Home medication  Response to Interventions: regimen makes pain tolerable  Accompanied by  Accompanied by: Alone    Imaging: Reviewed    Pathology: Reviewed    ECOG Peformance Status  ECOG Performance Status: 2  Distress Assessment Score: 3    Objective:     PHYSICAL EXAMINATION:    There were no vitals taken for this visit.    Gen: Alert, in NAD  Eyes: PERRL, EOMI, sclera anicteric  HENT     Head: NC/AT     Ears: No external auricular lesions     Nose/sinus: No rhinorrhea or epistaxis     Oropharynx: MMM, no visible oral lesions  CV: Well-perfused, no cyanosis, no pedal edema  Abdominal: BS+, soft, nontender, nondistended, no hepatomegaly  Back: No step-offs or pain to palpation along the thoracolumbar spine  Rectal: Deferred  : Deferred  Musculoskeletal: Normal muscle bulk and tone.  There is a moderate tenderness involving the right hip region consistent with a history of bone metastasis.  Skin: Normal color and turgor  Neurologic: A/Ox3, CN II-XII intact, normal gait and station  Psychiatric: Appropriate mood and affect    Intent of Therapy: Palliative  Side effects that may occur during or within weeks after Radiation Therapy      Fatigue and general weakness    Irritation or soreness of the irradiated area    Loss of hair on the irradiated area      Side  effects that may occur months or years after Radiation Therapy      Development of another tumor or cancer    Weakening of the bone            Osteoradionecrosis    Bone fracture    Poor healing after a trauma or surgery in the irradiated area    Nerve damage resulting in loss of strength and sensation    The risks, benefits and alternatives to radiation therapy were outlined with the patient. All questions were answered and a consent was signed.     Impression     Stage IV pancreatic cancer with clinically symptomatic right hip metastasis.     Assessment & Plan:     I have personally reviewed his upcoming medical record today.  I have also reviewed his most recent radiology study including CT and PET scan.  This is 59-year-old gentleman with a diagnosis of stage IVcancer with clinically symptomatic right hip metastasis.  The possible treatment options including surgery, systemic therapy, and radiation therapy has been discussed with the patient in detail and at the great lengths.  The possible risks and side effects of radiation therapy has also been explained to the patient.  Questions are answered to patient satisfaction.  I agree the patient is a good candidate to consider a short course of palliative radiation therapy to the right hip region.  I believe the patient can be potentially benefited by radiation therapy for local control, symptom relief, and the prevention of pathologic fracture.  Therefore radiation therapy is offered to the patient and he is willing to proceed being aware of potential risks and side effects involved.  He is scheduled to return to radiation oncology later on today for simulation.  I plan to give radiation therapy to a total dose of 3000 cGy in 10 treatments targeted to the right hip only.  The radiation therapy field is a small and the patient will not need to change his chemotherapy schedule.    Again, thank you very much for the referral and allowing me to participate in the care  of this patient.  If you have any questions or concerns about this consultation, please do not hesitate to call.  I spent approximately 45 minutes today with the patient and 80% time was used for counseling.      Sincerely,          Suzi Easley MD, PhD  Department of Radiation Oncology   [facility]  Tel: [phone]  Page: [phone]    [facility]  [address] [place] [address]     [address] [place] [address]    CC:  Patient Care Team:  Jose Brewer MD as PCP - General  Denise Jon MD as Physician (Hematology and Oncology)  Chely Ng RN as Oncology Nurse Navigator (Hematology and Oncology)  Suzi Easley MD as Physician (Radiation Oncology)

## 2021-06-10 NOTE — TELEPHONE ENCOUNTER
Very complex patient.  If he is not doing well, I think he needs to go to the ER.  These symptoms could be due to many different things.  Unlikely to be due to Zometa.    Denise Jon MD

## 2021-06-10 NOTE — TELEPHONE ENCOUNTER
Received message from RN stating that patient's wife called expressing concern that patient is continuing to lose weight. They would like to speak with a dietitian. Attempted call to patient twice and left voicemail, however unable to reach patient at this time.     RD will continue to follow and monitor.    Gricelda Jansen, MS, RD, LD

## 2021-06-11 NOTE — PROGRESS NOTES
[facility] Hematology and Oncology Progress Note    Patient: Jay Oneil  MRN: [id number]  Date of Service: 09/23/2020        Reason for Visit    Follow-up of metastatic cancer thought to be of pancreatic origin.    Assessment and Plan      ECOG Performance   ECOG Performance Status: 2    Distress Assessment  Distress Assessment Score: 5: Emotional support was given regarding his father.  Please see the discussion below about the plan for his own medical problems.    Pain  Pain Score (Initial OR Reassessment): 6: Please see the discussion below.    Mr. Jay Oneil is a 59 y.o. gentleman who was admitted to the hospital on 5/26/2020 through the ER after he was found to have hypercalcemia with a calcium level 15.7 at [facility] when he had preoperative labs done before a right hip replacement surgery.  He gave a history of increasing acid reflux symptoms, dysphagia and loss of 30 pounds of weight.  He had a past history of osteoarthritis, obstructive sleep apnea, hypertension, hypercholesterolemia, gout, COPD and chronic pain due to spinal degenerative disease.      The CT scan showed thickening of the distal wall of the asparagus, widespread metastatic lesions in the liver, a splenic metastasis, enlarged upper abdominal lymph nodes and multiple bilateral lung mets also.  He had an upper GI endoscopy done with showed extrinsic compression of the distal end of the esophagus but no esophageal mucosal lesion.  This appeared to be a lesion in the wall of the esophagus likely a metastatic site.  Thus this did not appear to be an esophageal cancer.  We obtained a biopsy from a liver metastasis on 5/28/2020 which showed a moderately differentiated adenocarcinoma.  The cells were CK7 positive, CD 19 9 positive and p53 positive.  The pathology impression was that this is metastasis from a pancreaticobiliary primary.  CA-19-9 level was elevated.  He was treated with IV fluids and with Zometa on  5/27/2020 and his calcium level came down nicely.  He was transferred to [facility] to have a gastrostomy tube placed because of the severe dysphagia.  A GJ tube was placed and an attempt was made to feed him through the jejunostomy for convenience.  Unfortunately that did not agree with him and he had a lot of abdominal cramping, diarrhea etc.  He had to be readmitted to the hospital.  Feeling was changed to be through the G-tube.  He had a Port-A-Cath placed.  He received the first cycle of FOLFOX chemotherapy as an inpatient starting on 06/10/2020.  He was discharged from the hospital after completing the fluorouracil infusion on 6/12/2020.    In the PET CT scan that he had done on 8/3/2020, after he had completed 4 cycles of FOLFOX chemotherapy, we could see the cancer in the head of the pancreas, multiple lymph nodes, numerous liver mets, lung mets and multiple bone mets.  Compared to the previous CT scans, the liver size had a decreased and the metastasis in the spleen was not very evident, thus he had responded to chemotherapy but he was still troubled with a lot of bone pain due to bone mets and he had persistent hypercalcemia.  We then decided to switch chemotherapy to FOLFIRINOX which he started on 8/5/2020.  We decided to change over from Zometa to Xgeva for hypercalcemia of malignancy also.    He received palliative radiation to the right hip region metastasis with 3000 cGy in 10 treatments completed from 8/17/2020, 29/1/2020.    1.  He has now completed 3 cycles of FOLFIRINOX chemotherapy.  Overall I think he tolerated the 3 cycles okay even though he did have the expected side effects of chemotherapy.  He is due for cycle #4 of FOLFIRINOX chemotherapy today.  He feels ready for it.  However when we look at his labs, blood counts show that his hemoglobin is down to 7.6.  WBC count and platelet count are adequate.  Metabolic panel is also overall stable enough.  We discussed and decided that we  will go for a 1 week break to allow for the hemoglobin to recover before giving him cycle #4 of FOLFIRINOX chemotherapy.  He preferred this option rather than being transfused today and being given chemotherapy.  I think that this is the wiser and safer option also.    2.  I discussed with him that after cycle #4 of chemotherapy we will obtain a PET CT scan to see how much headway we have made with FOLFIRINOX chemotherapy.  If he is clearly responding to chemotherapy, which is what I think is happening, then we will continue with FOLFIRINOX chemotherapy.  On the other hand if he is not responding to chemotherapy, we can consider a different chemotherapy regimen (gemcitabine and Abraxane).  He is also interested in newer treatment options.  I will ask for the tissue sample from the diagnostic biopsy to be sent for [facility] CDX testing.  By chance if he is found to have a targetable mutation, then we can consider dose of treatment options.    3.  Severe protein calorie malnutrition continues to be a problem.  I think he is still losing weight.  Total protein and albumin in today's labs are still quite low.  I think this is mainly due to cancer cachexia.  He is really trying to push the tube feeds and is taking therefore recommended amount of tube feeds.  He is also trying to take some additional feeding through the mouth.  He has met with the nutritionist.  I think the only way this will turn around is if the cancer response to the chemotherapy.  I recommended to him that he should continue with all the efforts that he is working with nutrition, especially try to increase the protein intake.  I explained to him that is why he is having the peripheral edema.  He can try to elevate his legs in the evening to try to reduce edema.    4.  He feels that the pain has improved after radiation.  The pain is mainly bone pain from the bone mets.  However he feels that when he takes MS Contin 15 mg p.o. 3 times a day, that  makes him sleepy and reduces his energy.  I discussed with him that if he thinks that the pain is under fairly good control we can try MS Contin 15 mg morning and evening.  In this manner if he feels better with the pain control and energy, we can consider further dose reduction of MS Contin.  He states that at this time opioid-induced constipation is not a problem.    5.  He is doing much better with the hypercalcemia of malignancy after being started on Xgeva.  Clearly it is working better than Zometa.  Calcium level today is 7.4.  We will continue with the same.  He is due for the next Xgeva around 10/7/2020.  We will try to make it coincide with cycle #5 of chemotherapy.    6.  Follow-up: Return to clinic with MD or NP in a week's time for cycle #4 of FOLFIRINOX chemotherapy with labs according to treatment plan.  I have ordered the PET CT scan for after that cycle which will be scheduled.    Time spend >30 minutes face to face with the patient. More than 50 % in counseling and coordination of care.        Problem List    1. Malignant neoplasm of head of pancreas (H)  CC OFFICE VISIT LONG    Pathology Additional Testing    morphine (MS CONTIN) 15 MG 12 hr tablet    morphine (MSIR) 15 MG tablet    NM PET CT Skull to Mid Thigh   2. Encounter for antineoplastic chemotherapy  CC OFFICE VISIT LONG   3. Liver metastases (H)  Pathology Additional Testing    NM PET CT Skull to Mid Thigh   4. Bone metastases (H)  NM PET CT Skull to Mid Thigh   5. Cancer associated pain  morphine (MS CONTIN) 15 MG 12 hr tablet    morphine (MSIR) 15 MG tablet   6. Severe protein-calorie malnutrition ([place]: less than 60% of standard weight) (H)     7. Hypercalcemia of malignancy          CC: Jose Brewer MD    ______________________________________________________________________________    History of Present Illness    Mr. Jay Oneil is here for follow-up alone.    He is here for cycle #4 of FOLFIRINOX chemotherapy.    He  says that he is a bit distressed because his father who had bladder cancer has decided to go on hospice.    Otherwise he feels that he has tolerated the 3 cycles of chemotherapy fairly well.  He feels that he has benefited from the radiation to the right hip and the right hip pain is better.  Overall pain rating now is 5/10.  The pain is still in bilateral hips, lower back and some myalgias.  He feels that the MS Contin tablets are making him feel more tired and sleepy.  He wonders whether he can reduce the dose.  He states that he is not taking any short-acting medication for breakthrough pain at all.    At this time constipation is not a problem at all for him.    He is still taking some fluids and soups through his mouth but has dysphagia for solids still.  He is taking the full recommended tube feeds as a continuous feeding.    He states that just after chemotherapy he has some numbness and tingling from the oxaliplatin that last for maybe 5 or 6 days after that things feel better.    He says that he has tiredness for about 5 or 6 days after each chemotherapy cycle but then things start getting better the week in between is quite good.    Continues to have some leg swelling which increases during the day and then goes down when he goes to sleep.    He is already received a flu shot from his primary care clinic.    No fevers or night sweats.  No lumps or bumps anywhere.  No unusual headaches.  No eyesight problems.  No mouth sores.  Breathing is okay.  No chest pain or cough.  No nausea or vomiting problems.  No abdominal pain.  No blood in stool or black stools.  Some loose stools.  No difficulty with urination.  No skin rashes.    Please see below.  A 14 point review of system is otherwise completely negative.    Pain Status  Currently in Pain: Yes    Review of Systems    Constitutional  Constitutional (WDL): Exceptions to WDL  Fatigue: Fatigue not relieved by rest - Limiting instrumental ADL  Fever:  None  Chills: None  Weight Gain: None  Weight Loss: to <10% from baseline, intervention not indicated(2#)  Neurosensory  Neurosensory (WDL): Exceptions to WDL  Peripheral Motor Neuropathy: Asymptomatic, clinical or diagnostic observations only, intervention not indicated  Ataxia: Moderate symptoms, limiting instrumental ADL  Peripheral Sensory Neuropathy: Moderate symptoms, limiting instrumental ADL(cold sensitivity, hands)  Confusion: None  Syncope: None  Cardiovascular  Cardiovascular (WDL): Exceptions to WDL  Palpitations: None  Edema: Yes  Edema Limbs: Grade 2  Edema Trunk: Swelling or obscuration of anatomic architecture on close inspection  Phlebitis: None  Superficial thrombophlebitis: None  Pulmonary  Respiratory (WDL): Exceptions to WDL  Cough: None  Dyspnea: Shortness of breath with moderate exertion  Hypoxia: None  Gastrointestinal  Gastrointestinal (WDL): Exceptions to WDL  Anorexia: Loss of appetite without alteration in eating habits  Constipation: None  Diarrhea: None  Dysphagia: Severely altered eating/swallowing, tube feeding or TPN or hospitalization indicated  Esophagitis: Symptomatic, altered eating/swallowing, oral supplements indicated  Nausea: Loss of appetite without alteration in eating habits(few episodees after chemo)  Pharyngitis: None  Vomiting: None  Dysgeusia: Altered taste with change in diet (e.g., oral supplements), noxious or unpleasant taste, loss of taste  Dry Mouth: Symptomatic (e.g., dry or thick saliva) without significant dietary alteration, unstimulated saliva flow >0.2 ml/min  Genitourinary  Genitourinary (WDL): All genitourinary elements are within defined limits  Integumentary  Integumentary (WDL): Exceptions to WDL(dry)  Patient Coping     Distress Assessment  Distress Assessment Score: 5  Accompanied by       Past History  Past Medical History:   Diagnosis Date     Chronic pain      COPD (chronic obstructive pulmonary disease) (H)      Gout      Hypercholesterolemia       Hypertension      Low back pain with sciatica      Obstructive sleep apnea 03/23/2008     Osteoarthritis          Past Surgical History:   Procedure Laterality Date     CERVICAL FUSION  07/09/2018    C5-C7     COLONOSCOPY  2001     COLONOSCOPY  09/2006     COLONOSCOPY  03/26/2010     COLONOSCOPY  10/23/2015     ESOPHAGOGASTRODUODENOSCOPY  05/27/2020     IR GJ TUBE INSERTION  6/1/2020     IR PORT PLACEMENT >5 YEARS  6/9/2020     LUMBAR LAMINECTOMY  1985, 2002    L4- L5 level.  X2.     WA ESOPHAGOGASTRODUODENOSCOPY TRANSORAL DIAGNOSTIC N/A 5/27/2020    Procedure: ESOPHAGOGASTRODUODENOSCOPY (EGD);  Surgeon: Yasmany Petit MD;  Location: [facility] OR;  Service: Gastroenterology     SHOULDER ARTHROSCOPY W/ ACROMIAL REPAIR Right 01/14/2016     TONSILLECTOMY  1964       Physical Exam    Recent Vitals 9/23/2020   Weight 190 lbs 3 oz   BSA (m2) 2.05 m2   /67   Pulse 96   Temp 97.7   Temp src 1   SpO2 98   Some recent data might be hidden       GENERAL: Alert and oriented to time place and person. Seated comfortably. In no distress.  He looks a bit fatigued.  Has lost some more weight.  Otherwise looks okay.  Appears more comfortable.    HEAD: Atraumatic and normocephalic.    EYES: CHRISTEN, EOMI.  Mild pallor.  No icterus.    Oral cavity: no mucosal lesion or tonsillar enlargement.    NECK: supple. JVP normal.  No thyroid enlargement.    LYMPH NODES: No palpable, cervical, axillary or inguinal lymphadenopathy.    CHEST: clear to auscultation bilaterally.  Resonant to percussion throughout bilaterally.  Symmetrical breath movements bilaterally.    The Port-A-Cath over the right upper chest looks unremarkable.    CVS: S1 and S2 are heard. Regular rate and rhythm.  No murmur or gallop or rub heard.  1+ pedal edema bilaterally.    ABDOMEN: Soft. Not tender.  No abdominal distention.  I think he has some free fluid.  The G-tube is in place.  No palpable hepatomegaly or splenomegaly.  No other mass  palpable.  Bowel sounds heard.    EXTREMITIES: Warm.    SKIN: no rash, or bruising or purpura.  Male pattern baldness.            Lab Results    Recent Results (from the past 168 hour(s))   Magnesium   Result Value Ref Range    Magnesium 1.8 1.8 - 2.6 mg/dL   Comprehensive Metabolic Panel   Result Value Ref Range    Sodium 138 136 - 145 mmol/L    Potassium 3.3 (L) 3.5 - 5.0 mmol/L    Chloride 103 98 - 107 mmol/L    CO2 26 22 - 31 mmol/L    Anion Gap, Calculation 9 5 - 18 mmol/L    Glucose 108 70 - 125 mg/dL    BUN 16 8 - 22 mg/dL    Creatinine 0.66 (L) 0.70 - 1.30 mg/dL    GFR MDRD Af Amer >60 >60 mL/min/1.73m2    GFR MDRD Non Af Amer >60 >60 mL/min/1.73m2    Bilirubin, Total 0.7 0.0 - 1.0 mg/dL    Calcium 7.4 (L) 8.5 - 10.5 mg/dL    Protein, Total 6.5 6.0 - 8.0 g/dL    Albumin 2.8 (L) 3.5 - 5.0 g/dL    Alkaline Phosphatase 241 (H) 45 - 120 U/L    AST 42 (H) 0 - 40 U/L    ALT 13 0 - 45 U/L   HM1 (CBC with Diff)   Result Value Ref Range    WBC 11.1 (H) 4.0 - 11.0 thou/uL    RBC 2.48 (L) 4.40 - 6.20 mill/uL    Hemoglobin 7.6 (L) 14.0 - 18.0 g/dL    Hematocrit 23.4 (L) 40.0 - 54.0 %    MCV 94 80 - 100 fL    MCH 30.6 27.0 - 34.0 pg    MCHC 32.5 32.0 - 36.0 g/dL    RDW 21.9 (H) 11.0 - 14.5 %    Platelets 123 (L) 140 - 440 thou/uL    MPV 10.1 8.5 - 12.5 fL   Manual Differential   Result Value Ref Range    Total Neutrophils % 75 (H) 50 - 70 %    Lymphocytes % 13 (L) 20 - 40 %    Monocytes % 7 2 - 10 %    Eosinophils %  0 0 - 6 %    Basophils % 0 0 - 2 %    Metamyelocytes % 3 (H) <=1 %    Myelocytes % 2 (H) <=1 %    Immature Granulocyte % - Manual 5 (H) <=0 %    Total Neutrophils Absolute 8.3 (H) 2.0 - 7.7 thou/ul    Lymphocytes Absolute 1.4 0.8 - 4.4 thou/uL    Monocytes Absolute 0.8 0.0 - 0.9 thou/uL    Eosinophils Absolute 0.0 0.0 - 0.4 thou/uL    Basophils Absolute 0.0 0.0 - 0.2 thou/uL    Metamyelocytes Absolute 0.3 (H) <=0.1 thou/uL    Myelocytes Absolute 0.2 (H) <=0.1 thou/uL    Immature Granulocyte Absolute - Manual  0.6 (H) <=0.0 thou/uL    Platelet Estimate Decreased (!) Normal    Ovalocytes 1+ (!) Negative    Polychromasia 1+ (!) Negative    Tear Drop Cells 1+ (!) Negative    Basophilic Stippling 1+ (!) Negative       Imaging    No results found.      Signed by: Denise Jon MD

## 2021-06-11 NOTE — TELEPHONE ENCOUNTER
Please review and advise.   Patient is taking MS Contin 15 mg three times daily. He has not needed any liquid morphine for break-through pain.   He says he is tired from the MS Contin.   He said his pain is better, as the radiation to hip is helping. He will have last radiation treatment 09/01.   Still has Lt hip discomfort from arthritis.   Patient says he was on Percocet prior to cancer for hip pain. He does not recall it making him tired like MS Contin does.   He thought he discussed pain meds and decrease or change.   His next appt here is 9/09, but he will be due for refill of MS Contin this week, so he is wondering your thoughts.  Barbra Tinoco RN

## 2021-06-11 NOTE — PROGRESS NOTES
Pt presented for labs, provider visit and poss chemo for metastatic lung cancer. Callie Gilmore RN

## 2021-06-11 NOTE — TELEPHONE ENCOUNTER
Pt wife Barbra called me back at 1125. I let her know her voice mail box was full. Barbra said that Johnie was extemely weak and in a lot of pain for which he is taking some tylenol, he can barely stand. Barbra said that Johnie slept most of the day yesterday and she did not hook up his tube feeding until later in the day. She has him hooked up to tube feeding now and is trying to push fluids. He does not have a fever, he had 1 loose stool yesterday and 2 runny stools today. Barbra was planning on starting imodium today.He is having trouble remembering things that happened yesterday. Johnie does not want to come into the clinic because he is in too much pain to want to move. I spoke with Herson Fernando and he wants pt to go to the ED. Barbra said Johnie did not want to move because of pain. I instructed her to call an ambulance to take Johnie to the ED. Barbra said she would see what she could do to convince Johnie. I told Barbra that if he is having confusion she may need to just make that decision for him.. Callie Gilmore, RN

## 2021-06-11 NOTE — PROGRESS NOTES
Pt here for their final radiation treatment. DC instructions given verbally and in writing, pt verbalized their understanding. Encouraged pt to make 4-6 week f/u telephone apt on their way out today.

## 2021-06-11 NOTE — TELEPHONE ENCOUNTER
Patient's spouse called in, he forgot to take dexamethasone starting Saturday after chemo, was to take 8 mg twice daily for three days. Wondering about taking it now?  Dr. Jon said no need as long as he isn't vomiting.   The patient was contacted and is aware of plan and verbalizes agreement..Barbra Tinoco RN

## 2021-06-11 NOTE — PROGRESS NOTES
[facility] Hematology and Oncology Progress Note    Patient: Jay Oneil  MRN: [id number]  Date of Service: 09/09/2020        Reason for Visit:    D1C3 palliative FOLFIRINOX chemotherapy    Assessment and Plan:      59 y.o.     Past history of osteoarthritis, obstructive sleep apnea, hypertension, hypercholesterolemia, gout, COPD and chronic pain due to spinal degenerative disease.    05/26 - 06/05/20 - hospitalized through the ED after he was found to have hypercalcemia with a calcium level 15.7 at [facility] when he had preoperative labs done before a right hip replacement surgery.  He gave a history of increasing acid reflux symptoms, dysphagia and loss of 30 pounds of weight.       CT scan showed thickening of the distal wall of the esophagus, widespread metastatic lesions in the liver, splenic metastasis, enlarged upper abdominal lymph nodes and multiple bilateral lung mets.      Upper GI endoscopy - extrinsic compression of the distal end of the esophagus but no esophageal mucosal lesion.  This appeared to be a lesion in the wall of the esophagus likely a metastatic site.  Thus this did not appear to be an esophageal cancer.      05/28/20 liver biopsy - c/w moderately differentiated adenocarcinoma.  The cells were CK7 positive, CD 19 9 positive and p53 positive.  The pathology impression was that this is metastasis from a pancreaticobiliary primary.     CA-19-9 level was elevated @ 70 U/mL.      GJ tube was placed.    He was treated with IV fluids and Zometa and his calcium level came down nicely.      06/07 - 06/12/20 hospitalized through the ED with worsening ABD pain.    06/09/20 - Port-A-Cath placed and received C1 palliative FOLFOX chemotherapy.      06/10 - 07/22/20 completed 4 cycles palliative FOLFOX chemotherapy     08/03/20 NM PET showed a somewhat mixed response to FOLFOX chemotherapy.      With worsening hypercalcemia and still losing weight it appeared that his cancer was behaving  quite aggressively.  Since he had tolerated FOLFOX chemotherapy well, his chemotherapy was changed to the FOLFIRINOX regimen + Neulasta since this is the most active regimen in clinical trials for metastatic pancreas cancer.      08/05/20 - D1C1 palliative FOLFIRINOX chemotherapy + Neulasta.     09/09/20:    CBC - WBC 6.6.  ANC 4.4.  HgB 8.8.  Plts 106K.    CMP - mild hypercalcemia @ 11.6.  Slightly improved hypoalbuminemia.  Quite stable elevated alk phos.    Due for Xgeva today.    Encouraged pushing oral fluids.    On tube feedings and oral liquids/soups.    Magnesium - WNL.    Ca19.9 - pending (prior 91).    Johnie presents alone.  He looks and states that he feels pretty good.  Chronic fatigue and weakness - worse the week after chemotherapy.  No nausea or vomiting - not using scheduled dex or anti-emetics post chemo.  Weight appears stable now after dropping ~30 pounds this past 6 months.  No concerning Neulasta bone pain.    Tolerating palliative chemotherapy acceptably well.      Hematologies and blood chemistries acceptable to proceed with C3 palliative FOLFIRINOX chemotherapy.    Reviewed if he would note a fever >100.4F he need to alert us as antibiotics or even hospitalization may be indicated.    [email or web address] [facility] precautions reviewed.  Instructed to follow state and federal recommendations.    09/23/20 - f/u D1C4 FOLFIRINOX chemotherapy with labs per Treatment Plans.    CA-19-9 level with each cycle of chemotherapy.      NM PET or CT scan before C5 palliative FOLFIRINOX.    2. Nutrition:    Esophageal dysphagia from metastasis to the lower end of the esophagus.    ~30 pound weight loss since start of chemotherapy.     Takes 6 cans/day Iso 1.5 tube feeding.    Followed by [place] nutritionist.      Encouraged pushing oral fluids, fluid flushes through the G-tube and eating what he can, mostly soups.    I reviewed that the hope is that if the cancer responds to chemotherapy, the cachexia will also start  "improving.    3. Cancer related pain:    Bilateral femoral lesions with some cortical thinning on the (R).  If this progresses he could have a fracture.      Completed palliative EBRT to bilateral femoral areas.    \"40% less pain\" since EBRT.    Stopped gabapentin - caused jerkiness.    Managed with:      MS Contin 15 mg p.o. every 8 hours.      Not requiring any breakthrough analgesia.    No opioid-induced constipation.      On daily Senna + Miralax    Occasional loose stools close to chemotherapy dosing.    ECOG Performance:     ECOG Performance Status: 2    Distress Assessment:    Distress Assessment Score: 2    Pain:    Pain Score (Initial OR Reassessment): 3: Please see problem #3 discussion above.        TT > 25 minutes face to face with > 50 % in counseling and coordination of care.    Herson Fernando, CNP      CC: Jose Brewer MD  ______________________________________________________________________________    Interim History:    Mr. Jay Oneil presents in scheduled follow-up alone.  He looks and states that he feels pretty good.  Chronic fatigue and weakness - worse the week after chemotherapy.  No nausea or vomiting - not using scheduled dex or anti-emetics post chemo.  Weight appears stable now after dropping ~30 pounds this past 6 months.  He states that he is taking continuous feeding.  He tried the bolus feeding but felt that it was filling him up too much.  He has also been flushing the G-tube frequently with water.  He states that he is yet able to swallow some liquids and pills.  In spite of trying to eat well he continues to lose weight, ~30 pounds since start of chemotherapy.  Constipation is not an issue.  He denies fevers, chills or night sweats.  No lumps or bumps anywhere.  No unusual headaches.  No visual or mentation disturbance problems.  No mouth sores, nausea or vomiting, chest pain, diarrhea no.  No difficulty with urination no.  No skin rashes.  No numbness or " tingling.    Pain Status:    Currently in Pain: Yes    Review of Systems:    Constitutional  Constitutional (WDL): Exceptions to WDL  Fatigue: Fatigue not relieved by rest - Limiting instrumental ADL  Neurosensory  Neurosensory (WDL): Exceptions to WDL  Peripheral Motor Neuropathy: Asymptomatic, clinical or diagnostic observations only, intervention not indicated  Ataxia: Moderate symptoms, limiting instrumental ADL  Peripheral Sensory Neuropathy: Moderate symptoms, limiting instrumental ADL(after chemo)  Confusion: None  Cardiovascular  Cardiovascular (WDL): Exceptions to WDL  Edema: Yes  Edema Limbs: 5 - 10% inter-limb discrepancy in volume or circumference at point of greatest visible difference, swelling or obscuration of anatomic architecture on close inspection  Pulmonary  Respiratory (WDL): Exceptions to WDL  Cough: Mild symptoms, nonprescription intervention indicated(occasional, dry)  Dyspnea: Shortness of breath with moderate exertion(with stairs)  Gastrointestinal  Gastrointestinal (WDL): Exceptions to WDL  Dysphagia: Severely altered eating/swallowing, tube feeding or TPN or hospitalization indicated(swallowing soup type consistency or liquids only)  Esophagitis: Severely altered eating/swallowing, tube feeding, TPN or hospitalization indicated  Dysgeusia: Altered taste with change in diet (e.g., oral supplements), noxious or unpleasant taste, loss of taste  Genitourinary  Genitourinary (WDL): All genitourinary elements are within defined limits  Integumentary  Integumentary (WDL): All integumentary elements are within defined limits  Patient Coping  Patient Coping: Accepting;Open/discussion  Distress Assessment  Distress Assessment Score: 2  Accompanied by  Accompanied by: Alone    Past History  Past Medical History:   Diagnosis Date     Chronic pain      COPD (chronic obstructive pulmonary disease) (H)      Gout      Hypercholesterolemia      Hypertension      Low back pain with sciatica       Obstructive sleep apnea 03/23/2008     Osteoarthritis          Past Surgical History:   Procedure Laterality Date     CERVICAL FUSION  07/09/2018    C5-C7     COLONOSCOPY  2001     COLONOSCOPY  09/2006     COLONOSCOPY  03/26/2010     COLONOSCOPY  10/23/2015     ESOPHAGOGASTRODUODENOSCOPY  05/27/2020     IR GJ TUBE INSERTION  6/1/2020     IR PORT PLACEMENT >5 YEARS  6/9/2020     LUMBAR LAMINECTOMY  1985, 2002    L4- L5 level.  X2.     [place] ESOPHAGOGASTRODUODENOSCOPY TRANSORAL DIAGNOSTIC N/A 5/27/2020    Procedure: ESOPHAGOGASTRODUODENOSCOPY (EGD);  Surgeon: Yasmany Petit MD;  Location: [facility];  Service: Gastroenterology     SHOULDER ARTHROSCOPY W/ ACROMIAL REPAIR Right 01/14/2016     TONSILLECTOMY  1964       Physical Exam    Recent Vitals 9/9/2020   Weight 192 lbs 2 oz   BSA (m2) 2.06 m2   /76   Pulse 101   Temp 97.7   Temp src 1   SpO2 95   Some recent data might be hidden       GENERAL:   Pleasant.  Alert and oriented.  Comfortable.  In no acute distress.  Appears chronically ill.    HEENT:   Atraumatic and normocephalic.  JASON.  EOMI.  No pallor.  No icterus.  No mucosal lesions or tonsillar enlargement.    LYMPH NODES:  No palpable cervical, axillary or inguinal lymphadenopathy.    CHEST:   Lungs clear to auscultation bilaterally.    Port-a-cath over the right chest looks unremarkable.    CVS:    S1 and S2 heard.  Regular rate and rhythm.  No murmur, gallop or rub heard.  No peripheral edema.    ABDOMEN:   Soft.  Not tender.  Not distended.  Liver firm and palpable about 7 cm below the costal margin in the midclavicular line.    GJ tube in the left upper quadrant - intact.  Dressing dry.    EXTREMITIES:  Warm.  No NANDA.    SKIN:    No rash, bruising or purpura noted.  Male pattern baldness    Lab Results:    Reviewed with patient.    Recent Results (from the past 24 hour(s))   Magnesium   Result Value Ref Range    Magnesium 2.1 1.8 - 2.6 mg/dL   Comprehensive Metabolic Panel   Result Value  Ref Range    Sodium 136 136 - 145 mmol/L    Potassium 4.3 3.5 - 5.0 mmol/L    Chloride 104 98 - 107 mmol/L    CO2 27 22 - 31 mmol/L    Anion Gap, Calculation 5 5 - 18 mmol/L    Glucose 123 70 - 125 mg/dL    BUN 11 8 - 22 mg/dL    Creatinine 0.64 (L) 0.70 - 1.30 mg/dL    GFR MDRD Af Amer >60 >60 mL/min/1.73m2    GFR MDRD Non Af Amer >60 >60 mL/min/1.73m2    Bilirubin, Total 0.5 0.0 - 1.0 mg/dL    Calcium 11.6 (H) 8.5 - 10.5 mg/dL    Protein, Total 6.8 6.0 - 8.0 g/dL    Albumin 2.8 (L) 3.5 - 5.0 g/dL    Alkaline Phosphatase 295 (H) 45 - 120 U/L    AST 38 0 - 40 U/L    ALT 9 0 - 45 U/L   HM1 (CBC with Diff)   Result Value Ref Range    WBC 6.6 4.0 - 11.0 thou/uL    RBC 2.82 (L) 4.40 - 6.20 mill/uL    Hemoglobin 8.8 (L) 14.0 - 18.0 g/dL    Hematocrit 27.3 (L) 40.0 - 54.0 %    MCV 97 80 - 100 fL    MCH 31.2 27.0 - 34.0 pg    MCHC 32.2 32.0 - 36.0 g/dL    RDW 21.3 (H) 11.0 - 14.5 %    Platelets 106 (L) 140 - 440 thou/uL    MPV 11.4 8.5 - 12.5 fL    Neutrophils % 67 50 - 70 %    Lymphocytes % 19 (L) 20 - 40 %    Monocytes % 12 (H) 2 - 10 %    Eosinophils % 0 0 - 6 %    Basophils % 0 0 - 2 %    Immature Granulocyte % 1 (H) <=0 %    Neutrophils Absolute 4.4 2.0 - 7.7 thou/uL    Lymphocytes Absolute 1.3 0.8 - 4.4 thou/uL    Monocytes Absolute 0.8 0.0 - 0.9 thou/uL    Eosinophils Absolute 0.0 0.0 - 0.4 thou/uL    Basophils Absolute 0.0 0.0 - 0.2 thou/uL    Immature Granulocyte Absolute 0.1 (H) <=0.0 thou/uL   Manual Differential   Result Value Ref Range    Platelet Estimate Decreased (!) Normal    Ovalocytes 1+ (!) Negative    Polychromasia 1+ (!) Negative    Basophilic Stippling 1+ (!) Negative       Imaging:    No results found.

## 2021-06-11 NOTE — PATIENT INSTRUCTIONS - HE
Recent Results (from the past 24 hour(s))   Magnesium   Result Value Ref Range    Magnesium 2.1 1.8 - 2.6 mg/dL   Comprehensive Metabolic Panel   Result Value Ref Range    Sodium 136 136 - 145 mmol/L    Potassium 4.3 3.5 - 5.0 mmol/L    Chloride 104 98 - 107 mmol/L    CO2 27 22 - 31 mmol/L    Anion Gap, Calculation 5 5 - 18 mmol/L    Glucose 123 70 - 125 mg/dL    BUN 11 8 - 22 mg/dL    Creatinine 0.64 (L) 0.70 - 1.30 mg/dL    GFR MDRD Af Amer >60 >60 mL/min/1.73m2    GFR MDRD Non Af Amer >60 >60 mL/min/1.73m2    Bilirubin, Total 0.5 0.0 - 1.0 mg/dL    Calcium 11.6 (H) 8.5 - 10.5 mg/dL    Protein, Total 6.8 6.0 - 8.0 g/dL    Albumin 2.8 (L) 3.5 - 5.0 g/dL    Alkaline Phosphatase 295 (H) 45 - 120 U/L    AST 38 0 - 40 U/L    ALT 9 0 - 45 U/L   HM1 (CBC with Diff)   Result Value Ref Range    WBC 6.6 4.0 - 11.0 thou/uL    RBC 2.82 (L) 4.40 - 6.20 mill/uL    Hemoglobin 8.8 (L) 14.0 - 18.0 g/dL    Hematocrit 27.3 (L) 40.0 - 54.0 %    MCV 97 80 - 100 fL    MCH 31.2 27.0 - 34.0 pg    MCHC 32.2 32.0 - 36.0 g/dL    RDW 21.3 (H) 11.0 - 14.5 %    Platelets 106 (L) 140 - 440 thou/uL    MPV 11.4 8.5 - 12.5 fL    Neutrophils % 67 50 - 70 %    Lymphocytes % 19 (L) 20 - 40 %    Monocytes % 12 (H) 2 - 10 %    Eosinophils % 0 0 - 6 %    Basophils % 0 0 - 2 %    Immature Granulocyte % 1 (H) <=0 %    Neutrophils Absolute 4.4 2.0 - 7.7 thou/uL    Lymphocytes Absolute 1.3 0.8 - 4.4 thou/uL    Monocytes Absolute 0.8 0.0 - 0.9 thou/uL    Eosinophils Absolute 0.0 0.0 - 0.4 thou/uL    Basophils Absolute 0.0 0.0 - 0.2 thou/uL    Immature Granulocyte Absolute 0.1 (H) <=0.0 thou/uL   Manual Differential   Result Value Ref Range    Platelet Estimate Decreased (!) Normal    Ovalocytes 1+ (!) Negative    Polychromasia 1+ (!) Negative    Basophilic Stippling 1+ (!) Negative

## 2021-06-11 NOTE — PROGRESS NOTES
Patient is here for two week follow-up of pancreatic cancer.  D1C4 FOLFIRINOX pending labs/OV with dr. Jon.  Barbra Tinoco RN\

## 2021-06-11 NOTE — TELEPHONE ENCOUNTER
Barbra called to let me know that she had Johnie in the car and was headed to the ED. She said she will need help to get Johnie out of the car, I told her to go into the ED and let them know she will need assistance,  I then called ED and spoke to Gricelda they will be glad to assist Johnie out of the car. Callie Gilmore, RN

## 2021-06-11 NOTE — TELEPHONE ENCOUNTER
Per Dr. Jon, reduce MS Contin to 15 mg two times a day (not using any break-through liquid morphine)  We will try that and can discuss at OV f/u.   Johnie will leave MS Contin at three times daily as he is having adequate pain control. Only complaint is being tired.   He also wants documentation that he stopped gabapentin.   He had been on that years ago from pain clinic and could not remember side effect of restless legs, jerking.  He had that again when using gabapentin, it has since resolved with stopping med.  Barbra Tinoco RN

## 2021-06-11 NOTE — PROGRESS NOTES
Radiation Treatment Summary    Patient: Jay Oneil   MRN: [id number]  : 1960  Care Provider: Suzi Easley    Date of Service: 2020        Denise Jon MD  [address] [place] [address]  [address] [place] [id number]               Dear Dr. Jon:     Your patient Mr. Jay Oneil completed his radiation therapy on 2020. As you know Mr. Oneil is a 59 y.o. male with a diagnosis of stage IV pancreatic cancer with clinically symptomatic right hip metastasis.  He received palliative radiation therapy to the right hip region with a total dose of 3000 cGy in 10 treatments given from -2020.  He tolerated radiation therapy very well with minimal side effect.  The patient also experienced significant pain reduction at the end of therapy.  He is now ranking his pain at 2/10 scale.  The patient is scheduled to return to radiation oncology in 4 to 6 weeks for routine postradiation therapy office follow-up.    Again, thank you very much for the referral and allowing me to participate in the care of this patient.  If you have any questions or concerns about this consultation, please do not hesitate to call.          Sincerely,        Suzi Easley MD, PhD  Department of Radiation Oncology   [facility]  Tel: [phone]  Page: [phone]    [id number] [place], [place] [id number] [place] [id number]      CC:  Patient Care Team:  Jose Brewer MD as PCP - General  Denise Jon MD as Physician (Hematology and Oncology)  Chely Ng RN as Oncology Nurse Navigator (Hematology and Oncology)  Suzi Easley MD as Physician (Radiation Oncology)

## 2021-06-11 NOTE — PROGRESS NOTES
Pt arrived to infusion clinic. Chemo pump DC'd. Neulasta injection given to RLQ of ABD, Band-aid applied. Port flushed with Saline and Heparin, de-accessed, band-aid applied. Pt ambulated out of infusion clinic independently.

## 2021-06-11 NOTE — TELEPHONE ENCOUNTER
I received a voice message from 0735 this am from Barbra, Johnie's wife that Johnie was feeling very weak and in bed most of the day yesterday and having diarrhea, they called to cancel chemo for today. I attempted to call pt and wife back for more information at 0830 and triage pt to potentially bring in for labs or fluids.  Barbra's voice mail box if full and Johnie did not answer. I attempted to call again at 1105 and still no answer. Callie Gilmore, RN

## 2021-06-11 NOTE — PROGRESS NOTES
RADIATION ONCOLOGY WEEKLY TREATMENT VISIT NOTE      Assessment / Impression       1. Bone metastases (H)       Tolerating radiation therapy well with moderate pain relief.  All questions and concerns addressed.    Plan:     Follow-up in 4 weeks.    Subjective:      HPI: Jay Oneil is a 59 y.o. male with    1. Bone metastases (H)         The following portions of the patient's history were reviewed and updated as appropriate: allergies, current medications, past family history, past medical history, past social history, past surgical history and problem list.    Assessment                  Body Site: Bone Site: R hip  Stereotactic Radiosurgery: No  Today's Dose: 3000  Total Dose for Bone: 3000  Today's Fraction/Total Fraction Bone: 10/10  Neuropathy - motor: 2: Mild objective weakness interfering with function, but not interfering with activites of daily living  Constipation: 0: None  Diarrhea W/O Colostomy: 1: Increase of less than 4 stools/day over pretreatment                                            Sexuality Alteration                 Emotional Alteration Copin: Effective  Comfort Alteration KPS: 70% Cannot do active work, but can care for self  Fatigue (ONS scale) : 8: Extreme Fatigue  Pain Location: whole pelvis  Pain Intensity. Rate degree of pain ranging from 0 (no pain) to 10 (severe pain) : 5  Pain Description: Ache - Muscular type ache  Pain Intervention: 3: Opiods  Effectiveness of pain intervention: 3: Pain relieved 75%   Nutrition Alteration Anorexia: 4: Requiring feeding tube or parental nutrition  Nausea: 1: Able to eat  Vomitin: None  Skin Alteration Skin Sensation: 0: No problem  Skin Reaction: 0: None  AUA Assessment                                  Accompanied by       Objective:     Exam: Examination reviewed no significant changes.    Vitals:    20 1400   Weight: 191 lb 3.2 oz (86.7 kg)       Wt Readings from Last 8 Encounters:   20 191 lb 3.2 oz  (86.7 kg)   08/19/20 200 lb 1.6 oz (90.8 kg)   08/17/20 198 lb 3.2 oz (89.9 kg)   08/05/20 197 lb 11.2 oz (89.7 kg)   08/03/20 197 lb (89.4 kg)   07/29/20 202 lb (91.6 kg)   07/22/20 204 lb 8 oz (92.8 kg)   07/08/20 202 lb 8 oz (91.9 kg)       General: Alert and oriented, in no acute distress  Jay has no Erythema.  Aria chart and setup information reviewed    Suzi Easley MD

## 2021-06-11 NOTE — PATIENT INSTRUCTIONS - HE
1.  Try to take in as much nutrition and fluid as you can.    2.  Try to remain as active as you can.    3.  Reduce the dose of MS Contin tablets to morning and evening.  If you have spikes of pain in between, take the immediate release morphine tablets to control the pain.

## 2021-06-11 NOTE — PROGRESS NOTES
"Pt feeling \"pretty good\" today though does note fingertips are more sensitive, unable to play his bass guitar. Port flushed and de-accessed. Pt amb out of clinic.   "

## 2021-06-12 NOTE — PROGRESS NOTES
/68   Pulse 100   Temp 97.9  F (36.6  C) (Oral)   SpO2 96%     Patient presented for pump discharge and Neulasta shot. Pump stopped, port flushed with heparin and deaccessed. Neulasta given subcutaneous to left arm. Left clinic independently.

## 2021-06-12 NOTE — TELEPHONE ENCOUNTER
Patient spouse called, LM, she mentioned medications and also that patient has had diarrhea since Monday, taking imodium.  Left message for patient to return call.   Barbra Tinoco RN

## 2021-06-12 NOTE — TELEPHONE ENCOUNTER
Navigator called pt's wife, Barbra, for a support call, as patient is hospitalized again and if having a very difficult time. Offered support, left phone number.  Will try calling back again.

## 2021-06-12 NOTE — PROGRESS NOTES
Pt presented for labs and provider visit for pancreas cancer with poss chemo or fluids. Callie Gilmore RN

## 2021-06-12 NOTE — TELEPHONE ENCOUNTER
Spoke with spouse, Johnie has had diarrhea multiple times daily since 10/12. She is giving him imodium, yesterday gave a total of 18 mg. She said he is also very weak. Urinating frequently. Sore perineum, using Desitin.  She ran his tube feeding past two days, did fluid flushes. Stopped TF this AM.   Discussed with Dr. Jon, patient to be seen this AM by Herson SERRANO CNP, possible fluids after assessment.   Barbra Tinoco RN

## 2021-06-12 NOTE — PROGRESS NOTES
[facility] Hematology and Oncology Progress Note    Patient: Jay Oneil  MRN: [id number]  Date of Service: 10/07/2020        Reason for Visit:    1-week delayed (significant anemia) C4 palliative FOLFIRINOX chemotherapy    Assessment and Plan:      59 y.o.     Past history of osteoarthritis, obstructive sleep apnea, hypertension, hypercholesterolemia, gout, COPD and chronic pain due to spinal degenerative disease.    05/26 - 06/05/20 - hospitalized through the ED after he was found to have hypercalcemia with a calcium level 15.7 at [facility] when he had preoperative labs done before a right hip replacement surgery.  He gave a history of increasing acid reflux symptoms, dysphagia and loss of 30 pounds of weight.       CT scan showed thickening of the distal wall of the esophagus, widespread metastatic lesions in the liver, splenic metastasis, enlarged upper abdominal lymph nodes and multiple bilateral lung mets.      Upper GI endoscopy - extrinsic compression of the distal end of the esophagus but no esophageal mucosal lesion.  This appeared to be a lesion in the wall of the esophagus likely a metastatic site.  Thus this did not appear to be an esophageal cancer.      05/28/20 liver biopsy - c/w moderately differentiated adenocarcinoma.  The cells were CK7 positive, CD 19 9 positive and p53 positive.  The pathology impression was that this is metastasis from a pancreaticobiliary primary.     CA-19-9 level was elevated @ 70 U/mL.      GJ tube was placed.    He was treated with IV fluids and Zometa and his calcium level came down nicely.      06/07 - 06/12/20 hospitalized through the ED with worsening ABD pain.    06/09/20 - Port-A-Cath placed and received C1 palliative FOLFOX chemotherapy.      06/10 - 07/22/20 completed 4 cycles palliative FOLFOX chemotherapy     08/03/20 NM PET showed a somewhat mixed response to FOLFOX chemotherapy.      With worsening hypercalcemia and still losing weight it  appeared that his cancer was behaving quite aggressively.  Since he had tolerated FOLFOX chemotherapy well, his chemotherapy was changed to the FOLFIRINOX regimen + Neulasta since this is the most active regimen in clinical trials for metastatic pancreas cancer.      08/05/20 - D1C1 palliative FOLFIRINOX chemotherapy + Neulasta.     09/30-10/01/2020 - hospitalized through the ED, 3 weeks s/p C3 FOLFIRINOX chemotherapy, with weakness and abdominal pain after running out of his pain medications.     09/30/2020 CT A&P - Interval development of large volume ascites and nodular stranding of the omental fat.  Hepatomegaly and hepatic metastases have decreased slightly. Pulmonary metastases stable to slightly decreased. Upper abdominal lymphadenopathy stable. Lytic bone metastases stable. Narrowing versus thrombosis of the left hepatic lobe segmental and subcutaneous subsegmental portal veins unchanged.    09/30/2020 US paracentesis - 4 liters of clear fluid removed.     10/07/2020:      CBC - mild neutrophilia.  HgB up a bit @ 8.5.  Plts 111K.    CMP:  Slightly improved hypoalbuminemia.  Alk phos and AST up a bit.    Resolved hypercalcemia on Xgeva.      Next Xgeva due today.      Slightly improved hypoalbuminemia.      Quite stable elevated alk phos.    Encouraged pushing oral fluids.    Magnesium - WNL.    Ca19.9 - 58 (prior 91).    Johnie presents alone.  He looks and states that he continues to feel a bit wore out with chronic fatigue and weakness - worse the week after chemotherapy.  No nausea or vomiting - not using scheduled dex or anti-emetics post chemo.  Weight dropped 12 pounds after paracentesis last week.  Weight now up 7 pounds again.  30 pound weight loss since diagnosis.  No concerning Neulasta bone pain.    I reviewed with Johnie that the CT A&P from 09/30 in the ED showed disease response to treatment.  Also his biochemical marker has come down a bit which supports this as well.  He was encouraged with this  news.    Tolerating palliative chemotherapy acceptably well.      Hematologies and blood chemistries acceptable to proceed with 1-week delayed C4 palliative FOLFIRINOX chemotherapy.    Reviewed if he would note a fever >100.4F he need to alert us as antibiotics or even hospitalization may be indicated.    Ascites/peripheral edema:    I explained the hypoalbuminemia and liver involvement with his cancer the main reasons for his peripheral edema.    His BP is OK to consider spironolactone or Nadolol to try and control the ascites/peripheral edema.  With quite rapid recurrent ascites since paracentesis last week, will begin with spironolactone 100 mg daily.  Discussed potential s/e of hypotension with resulting lightheadedness - he is to alert us if he would note.     Continue to elevate his legs when able to decrease NANDA.    [facility] CDX testing pending to check for a targetable mutation for treatment options.    [place] covid precautions reviewed.  Instructed to follow state and federal recommendations.    10/21/2020 - f/u D1C5 FOLFIRINOX chemotherapy with labs per Treatment Plans with NM PET.    CA-19-9 level with each cycle of chemotherapy.      2. Nutrition:    Esophageal dysphagia from metastasis to the lower end of the esophagus.    ~30 pound weight loss since start of chemotherapy.    Severe hypoalbuminemia.    Followed by [facility] nutritionist.    Taking 6 cans/day Iso 1.5 tube feeding.    He is trying to push the tube feeds and is taking what has been recommended.  He is also pushing oral fluids, fluid flushes through the G-tube and eating what he can, mostly soups.    I encouraged continued efforts with nutrition, especially try to increase the protein intake.       I reviewed that the hope is that if the cancer responds to chemotherapy, the cachexia will also start improving.    3. Cancer related pain:    Bilateral femoral lesions with some cortical thinning on the (R).  If this progresses he could have  "a fracture.      Completed palliative EBRT to bilateral femoral areas.    Less pain\" since EBRT.    Stopped gabapentin - caused jerkiness.    Managed with:      MS Contin 15 mg p.o. every 8 hours.      MSIR 15 mg - generally 2/day for breakthrough pain.    Discussed Palliative Care option for pain management - patient opted against.    No opioid-induced constipation.      On daily Senna + Miralax    Occasional loose stools close to chemotherapy dosing.    ECOG Performance:     ECOG Performance Status: 2    Distress Assessment:    Distress Assessment Score: 8    Pain:    Pain Score (Initial OR Reassessment): 7: Please see problem #3 discussion above.        TT > 25 minutes face to face with > 50 % in counseling and coordination of care.    Herson Fernando, CNP      CC: Jose Brewer MD  ______________________________________________________________________________    Interim History:    Mr. Jay Oneil presents in scheduled follow-up alone anticipating 1-week delayed (significant anemia) C4 palliative FOLFIRINOX chemotherapy.  He was hospitalized through the ED 09/30-10/01/20 with weakness and abdominal pain after running out of his pain medications.  He looks and states that he continues to feel a bit wore out with chronic fatigue and weakness - worse the week after chemotherapy.  His biggest complaint is the discomfort associated with the LE edema.  No nausea or vomiting - not using scheduled dex or anti-emetics post chemo.  Weight dropped 12 pounds after paracentesis last week.  Weight now up 7 pounds again.  30 pound weight loss since diagnosis.  He states that he is taking continuous feeding.  He tried the bolus feeding but felt that it was filling him up too much.  He has also been flushing the G-tube frequently with water.  He states that he is yet able to swallow some liquids and pills.  Constipation is not an issue.  He denies fevers, chills or night sweats, concerning lumps or bumps anywhere, " unusual headaches, visual or mentation disturbance problems, mouth sores, nausea or vomiting, chest pain, diarrhea, difficulty with urination, skin rashes, numbness or tingling.  No concerning Neulasta bone pain.      Pain Status:    Currently in Pain: Yes - see #3 above.    Review of Systems:    Constitutional  Constitutional (WDL): Exceptions to WDL  Fatigue: Fatigue not relieved by rest - Limiting instrumental ADL  Weight Loss: to <10% from baseline, intervention not indicated  Neurosensory  Neurosensory (WDL): Exceptions to WDL  Peripheral Motor Neuropathy: Asymptomatic, clinical or diagnostic observations only, intervention not indicated  Ataxia: Moderate symptoms, limiting instrumental ADL  Peripheral Sensory Neuropathy: Moderate symptoms, limiting instrumental ADL  Eye   Eye Disorder (WDL): All eye disorder elements are within defined limits  Ear  Ear Disorder (WDL): Exceptions to WDL(right ear plugged)  Cardiovascular  Cardiovascular (WDL): Exceptions to WDL  Edema: Yes  Edema Limbs: Grade 2  Edema Trunk: Swelling or obscuration of anatomic architecture on close inspection  Pulmonary  Respiratory (WDL): Exceptions to WDL  Dyspnea: Shortness of breath with moderate exertion  Gastrointestinal  Gastrointestinal (WDL): Exceptions to WDL(taking 4 cans of feeding thru g tube per day)  Anorexia: Oral intake altered without significant weight loss or malnutrition, oral nutritional supplements indicated(stuff gets stuck when i eat then i vomit)  Dysphagia: Severely altered eating/swallowing, tube feeding or TPN or hospitalization indicated  Dysgeusia: Altered taste with change in diet (e.g., oral supplements), noxious or unpleasant taste, loss of taste  Dry Mouth: Symptomatic (e.g., dry or thick saliva) without significant dietary alteration, unstimulated saliva flow >0.2 ml/min  Genitourinary  Genitourinary (WDL): All genitourinary elements are within defined limits  Lymphatic     Musculoskeletal and Connective  Tissue  Musculoskeletal and Connetive Tissue Disorders (WDL): Exceptions to WDL  Arthralgia: Moderate pain, limiting instrumental ADL  Bone Pain: Moderate pain, limiting instrumental ADL(back)  Muscle Weakness : Symptomatic, evident on physical exam, limiting instrumental ADL  Integumentary  Integumentary (WDL): Exceptions to WDL  Alopecia: Hair loss of up to 50% of normal for that individual that is not obvious from a distance but only on close inspection, a different hair style may be required to cover the hair loss but it does not require a wig or hair piece to camouflage  Patient Coping  Patient Coping: Accepting;Open/discussion  Distress Assessment  Distress Assessment Score: 8  Accompanied by  Accompanied by: Alone  Oral Chemo Adherence       Past History:    Past Medical History:   Diagnosis Date     Chronic pain      COPD (chronic obstructive pulmonary disease) (H)      Gout      Hypercholesterolemia      Hypertension      Low back pain with sciatica      Obstructive sleep apnea 03/23/2008     Osteoarthritis          Past Surgical History:   Procedure Laterality Date     CERVICAL FUSION  07/09/2018    C5-C7     COLONOSCOPY  2001     COLONOSCOPY  09/2006     COLONOSCOPY  03/26/2010     COLONOSCOPY  10/23/2015     ESOPHAGOGASTRODUODENOSCOPY  05/27/2020     IR GJ TUBE INSERTION  6/1/2020     IR PORT PLACEMENT >5 YEARS  6/9/2020     LUMBAR LAMINECTOMY  1985, 2002    L4- L5 level.  X2.     NH ESOPHAGOGASTRODUODENOSCOPY TRANSORAL DIAGNOSTIC N/A 5/27/2020    Procedure: ESOPHAGOGASTRODUODENOSCOPY (EGD);  Surgeon: Yasmany Petit MD;  Location: [facility];  Service: Gastroenterology     SHOULDER ARTHROSCOPY W/ ACROMIAL REPAIR Right 01/14/2016     TONSILLECTOMY  1964     US PARACENTESIS  9/30/2020       Physical Exam:    Recent Vitals 10/1/2020   Height -   Weight -   BSA (m2) -   BP 98/49   Pulse 76   Temp 98.2   Temp src 1   SpO2 97   Some recent data might be hidden       GENERAL:   Pleasant.  Alert and  oriented.  Comfortable.  In no acute distress.  Appears chronically ill.    HEENT:   Atraumatic and normocephalic.  JASON.  EOMI.  No pallor.  No icterus.  No mucosal lesions or tonsillar enlargement.    LYMPH NODES:  No palpable cervical, axillary or inguinal lymphadenopathy.    CHEST:   Lungs clear to auscultation bilaterally.    Port-a-cath over the right chest looks unremarkable.    CVS:    S1 and S2 heard.  Regular rate and rhythm.  No murmur, gallop or rub heard.  No peripheral edema.    ABDOMEN:   Soft.  Not tender.  Mildly distended.  Liver firm and palpable about 7 cm below the costal margin in the midclavicular line.    GJ tube in the left upper quadrant - intact.  Dressing dry.    EXTREMITIES:  Warm.  +2 NANDA to knees.    SKIN:    No rash, bruising or purpura noted.  Male pattern baldness    Lab Results:    Reviewed with patient.    Recent Results (from the past 24 hour(s))   Magnesium   Result Value Ref Range    Magnesium 1.8 1.8 - 2.6 mg/dL   Comprehensive Metabolic Panel   Result Value Ref Range    Sodium 138 136 - 145 mmol/L    Potassium 3.8 3.5 - 5.0 mmol/L    Chloride 107 98 - 107 mmol/L    CO2 22 22 - 31 mmol/L    Anion Gap, Calculation 9 5 - 18 mmol/L    Glucose 115 70 - 125 mg/dL    BUN 10 8 - 22 mg/dL    Creatinine 0.61 (L) 0.70 - 1.30 mg/dL    GFR MDRD Af Amer >60 >60 mL/min/1.73m2    GFR MDRD Non Af Amer >60 >60 mL/min/1.73m2    Bilirubin, Total 0.5 0.0 - 1.0 mg/dL    Calcium 7.4 (L) 8.5 - 10.5 mg/dL    Protein, Total 6.2 6.0 - 8.0 g/dL    Albumin 2.5 (L) 3.5 - 5.0 g/dL    Alkaline Phosphatase 198 (H) 45 - 120 U/L    AST 52 (H) 0 - 40 U/L    ALT 11 0 - 45 U/L   HM1 (CBC with Diff)   Result Value Ref Range    WBC 10.7 4.0 - 11.0 thou/uL    RBC 2.69 (L) 4.40 - 6.20 mill/uL    Hemoglobin 8.5 (L) 14.0 - 18.0 g/dL    Hematocrit 26.9 (L) 40.0 - 54.0 %     80 - 100 fL    MCH 31.6 27.0 - 34.0 pg    MCHC 31.6 (L) 32.0 - 36.0 g/dL    RDW 19.4 (H) 11.0 - 14.5 %    Platelets 111 (L) 140 - 440 thou/uL     MPV 10.7 8.5 - 12.5 fL    Neutrophils % 84 (H) 50 - 70 %    Lymphocytes % 8 (L) 20 - 40 %    Monocytes % 7 2 - 10 %    Eosinophils % 0 0 - 6 %    Basophils % 0 0 - 2 %    Immature Granulocyte % 1 (H) <=0 %    Neutrophils Absolute 9.0 (H) 2.0 - 7.7 thou/uL    Lymphocytes Absolute 0.9 0.8 - 4.4 thou/uL    Monocytes Absolute 0.7 0.0 - 0.9 thou/uL    Eosinophils Absolute 0.0 0.0 - 0.4 thou/uL    Basophils Absolute 0.0 0.0 - 0.2 thou/uL    Immature Granulocyte Absolute 0.1 (H) <=0.0 thou/uL       Imaging:    Reviewed with Johnie.    Ct Abdomen Pelvis Without Oral With Iv Contrast    Result Date: 9/30/2020  EXAM: CT ABDOMEN PELVIS WO ORAL W IV CONTRAST LOCATION: [facility] DATE/TIME: 9/30/2020 2:51 PM INDICATION: History of metastatic pancreas cancer, bloating. COMPARISON: PET/CT 08/03/2020, MRI abdomen 07/06/2020 and CTs abdomen and pelvis 06/07/2020 and 05/26/2020. TECHNIQUE: CT scan of the abdomen and pelvis was performed following injection of IV contrast. Multiplanar reformats were obtained. Dose reduction techniques were used. CONTRAST: Omnipaque 350, 100 mL IV FINDINGS: LOWER CHEST: Multiple bilateral pulmonary nodules in the visualized lungs, stable to slightly decreased in size. For example, a dominant right lower lobe nodule measures 12 mm (image 7), previously 13 mm at 08/03/2020 PET/CT. No pleural effusion. Central  venous catheter tip upper right atrium. Coronary artery calcification. Heart size normal. No pericardial effusion. Mild mural thickening of the visualized distal esophagus unchanged HEPATOBILIARY: Hepatomegaly secondary to metastases scattered throughout both lobes of the liver has decreased since 07/06/2020 and 06/07/2020 examinations (difficult to make accurate comparison with a 03/20/2020 PET/CT). For example, the craniocaudal dimension of the liver is 25 cm today compared with 27 cm at prior studies. The conglomerate metastases in the medial segment left hepatic lobe measures about 12  x 7 cm, previously 14 x 8 cm. Some of the individual discrete metastases are also slightly smaller. Borderline mild bile duct dilatation unchanged. No calcified gallstones. PANCREAS: A discrete pancreatic mass is not identified. SPLEEN: Splenomegaly stable at 19 x 17 x 8 cm. ADRENAL GLANDS: Normal. KIDNEYS/BLADDER: No significant mass, stone, or hydronephrosis. BOWEL AND PERITONEAL CAVITY: Large volume ascites has developed. Nodular stranding of the omental fat has developed and indeterminate for edema versus omental tumor implants. Well-positioned percutaneous gastrojejunostomy tube with tip in proximal jejunum. Multiple fluid-filled loops of normal caliber small bowel with relative decompression of the colon. No specific signs of bowel obstruction. LYMPH NODES: Angelic hepatis, peripancreatic and gastrohepatic ligament lymphadenopathy is stable. For example, the dominant angelic hepatis lymph node stable at 5.2 x 5.0 cm (image 77). VASCULATURE: Narrowing versus thrombosis of the left hepatic lobe segmental and subcutaneous subsegmental portal veins unchanged. PELVIC ORGANS: Fluid containing right inguinal hernia has developed. MUSCULOSKELETAL: Lytic bone metastases unchanged. For example, the 4.0 x 3.5 cm metastasis arising from the proximal right femur is stable.     1.  Interval development of large volume ascites and nodular stranding of the omental fat. 2.  Hepatomegaly and hepatic metastases have decreased slightly. 3.  Pulmonary metastases stable to slightly decreased in size. 4.  Upper abdominal lymphadenopathy stable. 5.  Lytic bone metastases stable. 6.  Narrowing versus thrombosis of the left hepatic lobe segmental and subcutaneous subsegmental portal veins unchanged.    Us Paracentesis    Result Date: 9/30/2020  EXAM: 1. PARACENTESIS 2. ULTRASOUND GUIDANCE LOCATION: [facility] DATE/TIME: 9/30/2020 5:05 PM INDICATION: Ascites. PROCEDURE: Informed consent obtained. Time out performed. The abdomen was  prepped and draped in a sterile fashion. 8 mL of 1 percent lidocaine was infused into local soft tissues. A 5 [place] catheter system was introduced into the abdominal ascites under  ultrasound guidance. 4 liters of clear fluid was removed and sent to lab if requested. Patient tolerated procedure well. Ultrasound imaging was obtained and placed in the patient's permanent medical record.     1.  Status post ultrasound-guided paracentesis. Reference CPT Code: 04942

## 2021-06-12 NOTE — PATIENT INSTRUCTIONS - HE
Recent Results (from the past 24 hour(s))   Magnesium   Result Value Ref Range    Magnesium 1.8 1.8 - 2.6 mg/dL   Comprehensive Metabolic Panel   Result Value Ref Range    Sodium 138 136 - 145 mmol/L    Potassium 3.8 3.5 - 5.0 mmol/L    Chloride 107 98 - 107 mmol/L    CO2 22 22 - 31 mmol/L    Anion Gap, Calculation 9 5 - 18 mmol/L    Glucose 115 70 - 125 mg/dL    BUN 10 8 - 22 mg/dL    Creatinine 0.61 (L) 0.70 - 1.30 mg/dL    GFR MDRD Af Amer >60 >60 mL/min/1.73m2    GFR MDRD Non Af Amer >60 >60 mL/min/1.73m2    Bilirubin, Total 0.5 0.0 - 1.0 mg/dL    Calcium 7.4 (L) 8.5 - 10.5 mg/dL    Protein, Total 6.2 6.0 - 8.0 g/dL    Albumin 2.5 (L) 3.5 - 5.0 g/dL    Alkaline Phosphatase 198 (H) 45 - 120 U/L    AST 52 (H) 0 - 40 U/L    ALT 11 0 - 45 U/L   HM1 (CBC with Diff)   Result Value Ref Range    WBC 10.7 4.0 - 11.0 thou/uL    RBC 2.69 (L) 4.40 - 6.20 mill/uL    Hemoglobin 8.5 (L) 14.0 - 18.0 g/dL    Hematocrit 26.9 (L) 40.0 - 54.0 %     80 - 100 fL    MCH 31.6 27.0 - 34.0 pg    MCHC 31.6 (L) 32.0 - 36.0 g/dL    RDW 19.4 (H) 11.0 - 14.5 %    Platelets 111 (L) 140 - 440 thou/uL    MPV 10.7 8.5 - 12.5 fL    Neutrophils % 84 (H) 50 - 70 %    Lymphocytes % 8 (L) 20 - 40 %    Monocytes % 7 2 - 10 %    Eosinophils % 0 0 - 6 %    Basophils % 0 0 - 2 %    Immature Granulocyte % 1 (H) <=0 %    Neutrophils Absolute 9.0 (H) 2.0 - 7.7 thou/uL    Lymphocytes Absolute 0.9 0.8 - 4.4 thou/uL    Monocytes Absolute 0.7 0.0 - 0.9 thou/uL    Eosinophils Absolute 0.0 0.0 - 0.4 thou/uL    Basophils Absolute 0.0 0.0 - 0.2 thou/uL    Immature Granulocyte Absolute 0.1 (H) <=0.0 thou/uL

## 2021-06-12 NOTE — TELEPHONE ENCOUNTER
Patient's wife, Barbra, called stating since patient has gotten home from the hospital, he is still not feeling well. She said he continues to have vomiting, diarrhea, abd pain.  She said she started giving him diarrhea medication so hoping this will improve his diarrhea. She said she is trying to push fluids, food.  She said patient is wondering if [facility] can come out and give him fluids.  She said she called and they can come out to their home but they need an order. She asked for call back to [phone].    I called Barbra back, patient in background.  She said patient is weak and still having abd pain, N/V/D.  She said she has been flushing feeding tube 180 ml 3-4 times per day with water. She said feedings are making his stomach hurt and him bloated.  She feels they are managing ok at home today and she doesn't feel she needs to take him to the hospital.  She did say she canceled his PET scan today and doesn't think he will be able to come in tomorrow for treatment. I strongly recommended they come in tomorrow, regardless if he thinks he can receive treatment or not. I told her this way we can draw labs, Herson can see him, and if he needs any additional fluids, we can do that tomorrow. She said appointment is so early and asked for different time. Herson's schedule is full but AREN Curran said she would call him, if there were any cancellations.  She verbalized understanding. She asked for order for daily home IV infusions and if possible labs at home tomorrow, if he can't come in.  I told her I would discuss with Herson and called her back with an update. She verbalized understanding. Message to JODEE Fernando CNP/ISRRAEL Haney, RN    Reviewed the above with Herson. He would like to see patient in clinic tomorrow, as scheduled.  He will not be ordering home IV fluids today. I called Barbra. She verbalized understanding. I told her if pain, weakness, condition worsens prior to him coming to his appt tomorrow  to go to ER. She verbalized understanding/ISRRAEL Haney RN

## 2021-06-12 NOTE — TELEPHONE ENCOUNTER
Navigator reached out to pt's wife via phone call and left message of support, as it is noted in chart that patient will likely begin hospice care following discharge from hospital yesterday. Offered support if there is anything I an do for them, and offered my acknowledgement that this process has been difficult and fast.   Will be open to supporting in any way that I can.

## 2021-06-12 NOTE — PROGRESS NOTES
[facility] Hematology and Oncology Progress Note    Patient: Jay Oneil  MRN: [id number]  Date of Service: 10/20/2020        Reason for Visit:    C5 palliative FOLFIRINOX chemotherapy    Assessment and Plan:    1. (?) Metastatic pancreaticobiliary adenocarcinoma:    59 y.o.     Past history of osteoarthritis, obstructive sleep apnea, hypertension, hypercholesterolemia, gout, COPD and chronic pain due to spinal degenerative disease.    05/26 - 06/05/20 - hospitalized through the ED after he was found to have hypercalcemia with a calcium level 15.7 at [facility] when he had preoperative labs done before a right hip replacement surgery.  He gave a history of increasing acid reflux symptoms, dysphagia and loss of 30 pounds of weight.       CT scan showed thickening of the distal wall of the esophagus, widespread metastatic lesions in the liver, splenic metastasis, enlarged upper abdominal lymph nodes and multiple bilateral lung mets.      Upper GI endoscopy - extrinsic compression of the distal end of the esophagus but no esophageal mucosal lesion.  This appeared to be a lesion in the wall of the esophagus likely a metastatic site.  Thus this did not appear to be an esophageal cancer.      05/28/20 liver biopsy - c/w moderately differentiated adenocarcinoma.  The cells were CK7 positive, CD 19 9 positive and p53 positive.  The pathology impression was that this is metastasis from a pancreaticobiliary primary.     CA-19-9 level was elevated @ 70 U/mL.      GJ tube was placed.    He was treated with IV fluids and Zometa and his calcium level came down nicely.      06/07 - 06/12/20 hospitalized through the ED with worsening ABD pain.    06/09/20 - Port-A-Cath placed and received C1 palliative FOLFOX chemotherapy.      06/10 - 07/22/20 completed 4 cycles palliative FOLFOX chemotherapy     08/03/20 NM PET showed a somewhat mixed response to FOLFOX chemotherapy.      With worsening hypercalcemia and still  losing weight it appeared that his cancer was behaving quite aggressively.  Since he had tolerated FOLFOX chemotherapy well, his chemotherapy was changed to the FOLFIRINOX regimen + Neulasta since this is the most active regimen in clinical trials for metastatic pancreas cancer.      08/05/20 - D1C1 palliative FOLFIRINOX chemotherapy + Neulasta.     09/30-10/01/2020 - hospitalized through the ED, 3 weeks s/p C3 FOLFIRINOX chemotherapy, with weakness and abdominal pain after running out of his pain medications.     09/30/2020 CT A&P - Interval development of large volume ascites and nodular stranding of the omental fat.  Hepatomegaly and hepatic metastases have decreased slightly. Pulmonary metastases stable to slightly decreased. Upper abdominal lymphadenopathy stable. Lytic bone metastases stable. Narrowing versus thrombosis of the left hepatic lobe segmental and subcutaneous subsegmental portal veins unchanged.    09/30/2020 US paracentesis - 4 liters of clear fluid removed.     10/21/2020:      CBC - HgB up a bit @ 9.8.  Plts and WBC WNL.    CMP:  EDITH.  Hyponatremia.  Hypoalbuminemia.  LFTs improved or WNL. Slightly elevated non-fasting BS.    Resolved hypercalcemia on Xgeva.      Magnesium - increased @ 2.7.    Ca19.9 - pending (prior 58 ... 91).    Johnie presents accompanied by his wife.  He looks and feels wore out with progressive fatigue and weakness.  Spends most of his time in bed the past couple weeks.  Persistent nausea and vomiting with Compazine.  Persistent diarrhea with multiple episodes/day despite lomotil every 6 hours.  Weight dropped another 25 pounds since last treatment 2 weeks ago, 60 pounds this past year.  He was unable to obtain NM PET for this appointment to assess disease response to treatment.    CT A&P from 09/30 in the ED did show disease response to treatment.  Also his biochemical marker has come down a bit which supports this as well.   [facility] [place] CDX testing pending to check for  a targetable mutation for treatment options.    Hematologies look quite good.  Blood chemistries show some dehydration.    Ascites:    US paracentesis of ~3 weeks ago removed 4 liters of clear fluid.    I explained the hypoalbuminemia and liver involvement with his cancer the main reasons for his ascites.    Peripheral edema much improved on spironolactone 100 mg daily.  Reviewed potential s/e of hypotension with resulting lightheadedness - he is to alert us if he would note.     Continue to elevate his legs when able to decrease NANDA.    Nausea/vomiting - compazine not effective.  Consider Zofran.    Diarrhea - not managed with scheduled lomotil every 6 hours - would try 2 tabs with 1st diarrhea episode ach 24 hour period followed by 1 tab with each subsequent episode, max 8 tabs/day.    With significant failure to thrive:    Will HOLD C5 FOLFIRINOX chemotherapy.    Will admit to the hospital for rehydration; improved control of nausea, vomiting and diarrhea; Palliative Care consult; restaging imaging to assess disease response to current palliative therapy; assess G-tube functioning; Nutrition consult.    Discussed DNR/DNI status with patient - patient desires no heroics.    Report given to hospitalist who kindly agreed to admit.    We will follow.     2. Nutrition:    Esophageal dysphagia from metastasis to the lower end of the esophagus.    ~40 pound weight loss since start of chemotherapy, 25 pounds since last treatment 2 weeks ago, 60 pound weight loss this past year.     Stable, significant hypoalbuminemia.    Followed by [place] nutritionist.    Taking 6 cans/day Iso 1.5 tube feeding.    He is trying to push the tube feeds and is taking what has been recommended.  Now regurgitating oral fluids.    I encouraged continued efforts with nutrition, especially try to increase the protein intake.       3. Cancer related pain:    Bilateral femoral lesions with some cortical thinning on the (R).  If this progresses  "he could have a fracture.      Completed palliative EBRT to bilateral femoral areas.    Less pain\" since EBRT.    Stopped gabapentin - caused jerkiness.    Pain acceptably well managed with:      MS Contin 15 mg p.o. every 8 hours.      MSIR 15 mg - generally 2/day for breakthrough pain.    Discussed Palliative Care option for pain management - patient opted against.    No opioid-induced constipation.      Aware of using Senna + Miralax when needed.    ECOG Performance:     ECOG Performance Status: 3    Distress Assessment:    Distress Assessment Score: 9    Pain:    Pain Score (Initial OR Reassessment): 7: Please see problem #3 discussion above.        TT > 25 minutes face to face with > 50 % in counseling and coordination of care.    Herson Fernando, CNP      CC: Jose Brewer MD  ________________________________________________________    Interim History:    Mr. Jay Oneil presents accompanied by his wife in scheduled follow-up anticipating C5 palliative FOLFIRINOX chemotherapy.  He was hospitalized through the ED 09/30-10/01/20 with weakness and abdominal pain after running out of his pain medications.  He was seen in the ED last week and DC'd home.  Johnie continues to look and feel wore out with progressive fatigue and weakness.  Spends most of his time in bed the past couple weeks.  Persistent nausea and vomiting with Compazine.  Persistent diarrhea with multiple episodes/day despite lomotil every 6 hours.  Weight dropped another 25 pounds since last treatment 2 weeks ago, 60 pounds this past year.  He states that he is taking continuous feeding.  He tried the bolus feeding but felt that it was filling him up too much.  He has also been flushing the G-tube frequently with water.  He was previously able to swallow some liquids and pills - now regurgitating oral fluids the past week or so.  Peripheral edema much improved on spironolactone 100 mg daily.  However, persistent ascites.  He denies fevers, " chills or night sweats, concerning lumps or bumps anywhere, unusual headaches, visual or mentation disturbance problems, mouth sores, chest pain, difficulty with urination, skin rashes, numbness or tingling.  He was unable to obtain NM PET for this appointment to assess disease response to treatment.       Pain Status:    Currently in Pain: Yes     Review of Systems:    Constitutional  Constitutional (WDL): Exceptions to WDL  Fatigue: Fatigue not relieved by rest - Limiting instrumental ADL  Fever: None  Chills: Moderate tremor of the entire body, narcotics indicated  Weight Loss: to <10% from baseline, intervention not indicated  Neurosensory  Neurosensory (WDL): Exceptions to WDL  Peripheral Motor Neuropathy: Asymptomatic, clinical or diagnostic observations only, intervention not indicated  Ataxia: Moderate symptoms, limiting instrumental ADL  Peripheral Sensory Neuropathy: Moderate symptoms, limiting instrumental ADL  Eye   Eye Disorder (WDL): All eye disorder elements are within defined limits  Ear  Ear Disorder (WDL): Exceptions to WDL(ears plugged)  Cardiovascular  Cardiovascular (WDL): Exceptions to WDL  Edema: Yes  Edema Limbs: 5 - 10% inter-limb discrepancy in volume or circumference at point of greatest visible difference, swelling or obscuration of anatomic architecture on close inspection  Edema Trunk: Readily apparent obscuration of anatomic architecture, obliteration of skin folds, readily apparent deviation from normal anatomic contour, limiting instrumental ADL(abdomen swollen)  Pulmonary  Respiratory (WDL): Exceptions to WDL  Dyspnea: Shortness of breath with moderate exertion  Gastrointestinal  Gastrointestinal (WDL): Exceptions to WDL(getting hiccups and then cant catch his breath)  Anorexia: Oral intake altered without significant weight loss or malnutrition, oral nutritional supplements indicated  Diarrhea: Increase of greater than or equal to 7 stools per day over baseline, incontinence,  hospitalization indicated, severe increase in ostomy output compared to baseline, limiting self care ADL  Dysphagia: Severely altered eating/swallowing, tube feeding or TPN or hospitalization indicated  Esophagitis: Symptomatic, altered eating/swallowing, oral supplements indicated  Nausea: Loss of appetite without alteration in eating habits  Vomiting: 3 - 5 episodes ( by 5 minutes) in 24 hrs  Dysgeusia: Altered taste with change in diet (e.g., oral supplements), noxious or unpleasant taste, loss of taste  Dry Mouth: Moderate symptoms, oral intake alterations (e.g., copious water, other lubricants, diet limited to purees and/or soft, moist foods), unstimulated saliva 0.1 to 0.2 ml/min  Genitourinary  Genitourinary (WDL): Exceptions to WDL(urine looks dark)  Lymphatic     Musculoskeletal and Connective Tissue  Musculoskeletal and Connetive Tissue Disorders (WDL): Exceptions to WDL  Bone Pain: Moderate pain, limiting instrumental ADL  Muscle Weakness : Symptomatic, evident on physical exam, limiting instrumental ADL  Myalgia: Mild pain  Integumentary  Integumentary (WDL): Exceptions to WDL(burning skin on nose, bloody nose occasionally)  Alopecia: Hair loss of up to 50% of normal for that individual that is not obvious from a distance but only on close inspection, a different hair style may be required to cover the hair loss but it does not require a wig or hair piece to camouflage  Patient Coping  Patient Coping: Accepting;Open/discussion;Anxiety  Distress Assessment  Distress Assessment Score: 9  Accompanied by  Accompanied by: Family Member(wife emiliano)  Oral Chemo Adherence       Past History:    Past Medical History:   Diagnosis Date     Chronic pain      COPD (chronic obstructive pulmonary disease) (H)      Gout      Hypercholesterolemia      Hypertension      Low back pain with sciatica      Obstructive sleep apnea 03/23/2008     Osteoarthritis          Past Surgical History:   Procedure Laterality  Date     CERVICAL FUSION  07/09/2018    C5-C7     COLONOSCOPY  2001     COLONOSCOPY  09/2006     COLONOSCOPY  03/26/2010     COLONOSCOPY  10/23/2015     ESOPHAGOGASTRODUODENOSCOPY  05/27/2020     IR GJ TUBE INSERTION  6/1/2020     IR PORT PLACEMENT >5 YEARS  6/9/2020     LUMBAR LAMINECTOMY  1985, 2002    L4- L5 level.  X2.     KS ESOPHAGOGASTRODUODENOSCOPY TRANSORAL DIAGNOSTIC N/A 5/27/2020    Procedure: ESOPHAGOGASTRODUODENOSCOPY (EGD);  Surgeon: Yasmany Petit MD;  Location: [facility] OR;  Service: Gastroenterology     SHOULDER ARTHROSCOPY W/ ACROMIAL REPAIR Right 01/14/2016     TONSILLECTOMY  1964     US PARACENTESIS  9/30/2020       Physical Exam:    Recent Vitals 10/20/2020   Weight 160 lbs 10 oz   BSA (m2) 1.88 m2   /85   Pulse 118   Temp -   Temp src -   SpO2 99   Some recent data might be hidden       GENERAL:   Pleasant.  Alert and oriented.  Comfortable.  In no acute distress.  Appears chronically ill.  Wife accompanies.    HEENT:   Atraumatic and normocephalic.  JASON.  EOMI.  No pallor.  No icterus.  No mucosal lesions or tonsillar enlargement.    LYMPH NODES:  No palpable cervical, axillary or inguinal lymphadenopathy.    CHEST:   Lungs clear to auscultation bilaterally.    Port-a-cath over the right chest looks unremarkable.    CVS:    S1 and S2 heard.  Regular rate and rhythm.  No murmur, gallop or rub heard.  No peripheral edema.    ABDOMEN:   Soft.  Not tender.  Mildly distended.  Liver firm and palpable about 7 cm below the costal margin in the midclavicular line.    GJ tube in the left upper quadrant - intact.  Dressing dry.    EXTREMITIES:  Warm.  Resolved NANDA.    SKIN:    No rash, bruising or purpura noted.  Male pattern baldness    Lab Results:    Reviewed with patient.    Recent Results (from the past 24 hour(s))   Magnesium   Result Value Ref Range    Magnesium 2.7 (H) 1.8 - 2.6 mg/dL   Comprehensive Metabolic Panel   Result Value Ref Range    Sodium 132 (L) 136 - 145 mmol/L     Potassium 3.5 3.5 - 5.0 mmol/L    Chloride 98 98 - 107 mmol/L    CO2 14 (L) 22 - 31 mmol/L    Anion Gap, Calculation 20 (H) 5 - 18 mmol/L    Glucose 201 (H) 70 - 125 mg/dL    BUN 43 (H) 8 - 22 mg/dL    Creatinine 1.79 (H) 0.70 - 1.30 mg/dL    GFR MDRD Af Amer 47 (L) >60 mL/min/1.73m2    GFR MDRD Non Af Amer 39 (L) >60 mL/min/1.73m2    Bilirubin, Total 2.1 (H) 0.0 - 1.0 mg/dL    Calcium 8.2 (L) 8.5 - 10.5 mg/dL    Protein, Total 7.6 6.0 - 8.0 g/dL    Albumin 2.8 (L) 3.5 - 5.0 g/dL    Alkaline Phosphatase 145 (H) 45 - 120 U/L    AST 17 0 - 40 U/L    ALT 15 0 - 45 U/L   HM1 (CBC with Diff)   Result Value Ref Range    WBC 8.6 4.0 - 11.0 thou/uL    RBC 3.06 (L) 4.40 - 6.20 mill/uL    Hemoglobin 9.8 (L) 14.0 - 18.0 g/dL    Hematocrit 28.9 (L) 40.0 - 54.0 %    MCV 94 80 - 100 fL    MCH 32.0 27.0 - 34.0 pg    MCHC 33.9 32.0 - 36.0 g/dL    RDW 17.1 (H) 11.0 - 14.5 %    Platelets 164 140 - 440 thou/uL    MPV 11.9 8.5 - 12.5 fL   Manual Differential   Result Value Ref Range    Total Neutrophils % 79 (H) 50 - 70 %    Lymphocytes % 9 (L) 20 - 40 %    Monocytes % 11 (H) 2 - 10 %    Eosinophils %  0 0 - 6 %    Basophils % 0 0 - 2 %    Myelocytes % 1 <=1 %    Immature Granulocyte % - Manual 1 (H) <=0 %    Total Neutrophils Absolute 6.8 2.0 - 7.7 thou/ul    Lymphocytes Absolute 0.8 0.8 - 4.4 thou/uL    Monocytes Absolute 0.9 0.0 - 0.9 thou/uL    Eosinophils Absolute 0.0 0.0 - 0.4 thou/uL    Basophils Absolute 0.0 0.0 - 0.2 thou/uL    Myelocytes Absolute 0.1 <=0.1 thou/uL    Immature Granulocyte Absolute - Manual 0.1 (H) <=0.0 thou/uL    Manual nRBC per 100 Cells 1 (H) 0-<1    Platelet Estimate Normal Normal    Ovalocytes 1+ (!) Negative    Polychromasia 1+ (!) Negative    Tear Drop Cells 1+ (!) Negative    Reactive Lymphocytes 1+ (!) Negative    Toxic Granulation 1+ (!) Negative       Imaging:    Reviewed with Johnie.    Ct Abdomen Pelvis Without Oral With Iv Contrast    Result Date: 9/30/2020  EXAM: CT ABDOMEN PELVIS WO ORAL W IV  CONTRAST LOCATION: [facility] DATE/TIME: 9/30/2020 2:51 PM INDICATION: History of metastatic pancreas cancer, bloating. COMPARISON: PET/CT 08/03/2020, MRI abdomen 07/06/2020 and CTs abdomen and pelvis 06/07/2020 and 05/26/2020. TECHNIQUE: CT scan of the abdomen and pelvis was performed following injection of IV contrast. Multiplanar reformats were obtained. Dose reduction techniques were used. CONTRAST: Omnipaque 350, 100 mL IV FINDINGS: LOWER CHEST: Multiple bilateral pulmonary nodules in the visualized lungs, stable to slightly decreased in size. For example, a dominant right lower lobe nodule measures 12 mm (image 7), previously 13 mm at 08/03/2020 PET/CT. No pleural effusion. Central  venous catheter tip upper right atrium. Coronary artery calcification. Heart size normal. No pericardial effusion. Mild mural thickening of the visualized distal esophagus unchanged HEPATOBILIARY: Hepatomegaly secondary to metastases scattered throughout both lobes of the liver has decreased since 07/06/2020 and 06/07/2020 examinations (difficult to make accurate comparison with a 03/20/2020 PET/CT). For example, the craniocaudal dimension of the liver is 25 cm today compared with 27 cm at prior studies. The conglomerate metastases in the medial segment left hepatic lobe measures about 12 x 7 cm, previously 14 x 8 cm. Some of the individual discrete metastases are also slightly smaller. Borderline mild bile duct dilatation unchanged. No calcified gallstones. PANCREAS: A discrete pancreatic mass is not identified. SPLEEN: Splenomegaly stable at 19 x 17 x 8 cm. ADRENAL GLANDS: Normal. KIDNEYS/BLADDER: No significant mass, stone, or hydronephrosis. BOWEL AND PERITONEAL CAVITY: Large volume ascites has developed. Nodular stranding of the omental fat has developed and indeterminate for edema versus omental tumor implants. Well-positioned percutaneous gastrojejunostomy tube with tip in proximal jejunum. Multiple fluid-filled  loops of normal caliber small bowel with relative decompression of the colon. No specific signs of bowel obstruction. LYMPH NODES: Angelic hepatis, peripancreatic and gastrohepatic ligament lymphadenopathy is stable. For example, the dominant angelic hepatis lymph node stable at 5.2 x 5.0 cm (image 77). VASCULATURE: Narrowing versus thrombosis of the left hepatic lobe segmental and subcutaneous subsegmental portal veins unchanged. PELVIC ORGANS: Fluid containing right inguinal hernia has developed. MUSCULOSKELETAL: Lytic bone metastases unchanged. For example, the 4.0 x 3.5 cm metastasis arising from the proximal right femur is stable.     1.  Interval development of large volume ascites and nodular stranding of the omental fat. 2.  Hepatomegaly and hepatic metastases have decreased slightly. 3.  Pulmonary metastases stable to slightly decreased in size. 4.  Upper abdominal lymphadenopathy stable. 5.  Lytic bone metastases stable. 6.  Narrowing versus thrombosis of the left hepatic lobe segmental and subcutaneous subsegmental portal veins unchanged.    Us Paracentesis    Result Date: 9/30/2020  EXAM: 1. PARACENTESIS 2. ULTRASOUND GUIDANCE LOCATION: [facility] DATE/TIME: 9/30/2020 5:05 PM INDICATION: Ascites. PROCEDURE: Informed consent obtained. Time out performed. The abdomen was prepped and draped in a sterile fashion. 8 mL of 1 percent lidocaine was infused into local soft tissues. A 5 Welsh catheter system was introduced into the abdominal ascites under  ultrasound guidance. 4 liters of clear fluid was removed and sent to lab if requested. Patient tolerated procedure well. Ultrasound imaging was obtained and placed in the patient's permanent medical record.     1.  Status post ultrasound-guided paracentesis. Reference CPT Code: 15512

## 2021-06-13 NOTE — PRE-PROCEDURE
Procedure Name: tunneled peritoneal drainage catheter   Date/Time: 12/2/2020 8:18 AM  Written consent obtained?: Yes  Risks and benefits: Risks, benefits and alternatives were discussed  Consent given by: patient  Expected level of sedation: moderate  ASA Class: Class 3- Severe systemic disease, definite functional limitations  Mallampati: Grade 3- soft palate visible, posterior pharyngeal wall not visible  Patient states understanding of procedure being performed: Yes  Patient's understanding of procedure matches consent: Yes  Procedure consent matches procedure scheduled: Yes  Appropriately NPO: yes  Lungs: lungs clear with good breath sounds bilaterally  Heart: normal heart sounds and rate  History & Physical reviewed: History and physical reviewed and no updates needed  Statement of review: I have reviewed the lab findings, diagnostic data, medications, and the plan for sedation

## 2021-06-16 PROBLEM — N17.9 ACUTE KIDNEY FAILURE, UNSPECIFIED (H): Status: ACTIVE | Noted: 2020-01-01

## 2021-06-16 PROBLEM — C78.02 MALIGNANT NEOPLASM METASTATIC TO BOTH LUNGS (H): Status: ACTIVE | Noted: 2020-01-01

## 2021-06-16 PROBLEM — R18.8 ASCITES: Status: ACTIVE | Noted: 2020-01-01

## 2021-06-16 PROBLEM — A41.9 SEPTIC SHOCK (H): Status: ACTIVE | Noted: 2020-01-01

## 2021-06-16 PROBLEM — G89.3 CANCER RELATED PAIN: Status: ACTIVE | Noted: 2020-01-01

## 2021-06-16 PROBLEM — C25.9 PANCREATIC CANCER (H): Status: ACTIVE | Noted: 2020-01-01

## 2021-06-16 PROBLEM — C25.0 MALIGNANT NEOPLASM OF HEAD OF PANCREAS (H): Status: ACTIVE | Noted: 2020-01-01

## 2021-06-16 PROBLEM — E83.52 HYPERCALCEMIA OF MALIGNANCY: Status: ACTIVE | Noted: 2020-01-01

## 2021-06-16 PROBLEM — T40.2X5A CONSTIPATION DUE TO OPIOID THERAPY: Status: ACTIVE | Noted: 2020-01-01

## 2021-06-16 PROBLEM — C78.89: Status: ACTIVE | Noted: 2020-01-01

## 2021-06-16 PROBLEM — Z51.11 ENCOUNTER FOR ANTINEOPLASTIC CHEMOTHERAPY: Status: ACTIVE | Noted: 2020-01-01

## 2021-06-16 PROBLEM — C78.01 MALIGNANT NEOPLASM METASTATIC TO BOTH LUNGS (H): Status: ACTIVE | Noted: 2020-01-01

## 2021-06-16 PROBLEM — K59.03 CONSTIPATION DUE TO OPIOID THERAPY: Status: ACTIVE | Noted: 2020-01-01

## 2021-06-16 PROBLEM — R19.7 DIARRHEA, UNSPECIFIED TYPE: Status: ACTIVE | Noted: 2020-01-01

## 2021-06-16 PROBLEM — R65.21 SEPTIC SHOCK (H): Status: ACTIVE | Noted: 2020-01-01

## 2021-06-17 NOTE — TELEPHONE ENCOUNTER
Telephone Encounter by Barbra Tinoco RN at 8/10/2020  2:58 PM     Author: Barbra Tinoco RN Service: -- Author Type: Registered Nurse    Filed: 8/10/2020  2:59 PM Encounter Date: 8/10/2020 Status: Signed    : Barbra Tinoco RN (Registered Nurse)       Cancer Care Refill Protocol Passed     Rerun Protocol  (8/10/2020 2:27 PM)       Cancer Care visit in the last 12 months      Last office visit: 8/5/2020 Denise Jon MD Next office visit within 3 mo: 8/19/2020 Chelo Peres CNP  Last MTM visit: Visit date not found    Prescriber or current provider: Denise Jon MD  Last diagnosis associated with med order:   1. Malignant neoplasm of pancreas, unspecified location of malignancy (H)  - gabapentin (NEURONTIN) 250 mg/5 mL solution [Pharmacy Med Name: GABAPENTIN 250MG/5ML SOLN]; TAKE 2.5 ML BY MOUTH AT BEDTIME  Dispense: 75 mL; Refill: 0

## 2021-06-17 NOTE — TELEPHONE ENCOUNTER
Telephone Encounter by Lucas Lynn at 8/7/2020  8:36 AM     Author: Lucas Lynn Service: -- Author Type: Financial Resource Guide    Filed: 8/7/2020  8:36 AM Encounter Date: 8/6/2020 Status: Signed    : Lucas Lynn (Financial Resource Guide)       PA was approved for Morphine tablets.     Medication Prior Authorization    Start Date:  8/6/20  Status:  Approved  Approval Date:  8/7/20  Authorization Effective Date:  8/6/20  Authorization Expiration Date:  8/6/22  Type:  New  Urgency:  Urgent  Med Type:  Non-Specialty  Insurance Info:  [employer] ([facility]) - Phone [phone] Fax [phone]  Method:  ePA  Reference #:  Key: [id number] - PA Case ID: [id number]  Pharmacy Notified:  Yes  Patient Notified:  Yes  ___________________________________________________________________________________________________________________:

## 2021-06-17 NOTE — TELEPHONE ENCOUNTER
Telephone Encounter by Callie Gilmore RN at 9/4/2020  9:10 AM     Author: Callie Gilmore RN Service: -- Author Type: Registered Nurse    Filed: 9/4/2020  9:12 AM Encounter Date: 9/2/2020 Status: Signed    : Callie Gilmore RN (Registered Nurse)       I gave Johnie the following information from Chelo Peres . Johnie said he has had about 40% reduction in his hip pain since starting radiation and will trial cutting down to 2 MSContin per day if it works.     Chelo Peres, CNP  You Yesterday (7:42 AM)       I will refill for three times a day for now, but per recent phone call it appears he was going to possible back down to two times a day since pain was better and he thought it may be making him tired. Let pt know he can trial two times a day and if m/ore pain go back up to three times a day until we see him    Routing comment

## 2021-06-18 NOTE — PATIENT INSTRUCTIONS - HE
Patient Instructions by Olya Whitaker RN at 8/13/2020  1:00 PM     Author: Olya Whitaker RN Service: -- Author Type: Registered Nurse    Filed: 8/13/2020  1:56 PM Encounter Date: 8/13/2020 Status: Signed    : Olay Whitaker RN (Registered Nurse)       Patient Education     Radiation Therapy Treatment  Radiation therapy uses high-energy X-rays to kill cancer cells. Radiation therapy can help you in your fight against cancer. It begins with a session to discuss treatment with your healthcare provider. If you and your provider decide on radiation, you will return for a treatment planning visit called a simulation. Then you will start treatment.  The simulation is a planning session that helps your healthcare provider target your cancer. He or she will design a radiation plan to protect your healthy tissues from radiation. Your radiation therapy team uses a special machine called a simulator to map out your treatment. The simulator is usually an X-ray machine (fluoroscopy), CT scanner, MRI scanner, or PET-CT scanner machine. Laser lights act as guides to help position your body accurately. During this visit:    The team figures out the best position for your body. They make notes in your chart so youll be placed the same way each time.    They may use special devices to keep your body correctly positioned and still during treatment. These may include molds, masks, rests, and blocks.    The team makes ink marks on your skin. These will help you get in the same position for each treatment. Tiny permanent tattoos may also be used. These tattoos may be removed later with laser treatments.    Markers such as metal balls or wires may be put on or in your body. Sometime these are taped to the skin to help with the imaging process. These work with the X-rays to position your body. The markers are removed when the visit is over.  After the team has the imaging and data, the information is sent into the computer  planning system. Your doctor and the team of physicists and dosimetrists design a treatment field. The field will best target your cancer and how it might spread. It will also help limit radiation to nearby normal tissues.  Your treatments  When the simulation and plan are completed, you will begin your daily treatments. Treatment is usually once daily, Monday through Friday, for 5 to 7 weeks. It takes less than 30 minutes. Sometimes you may need radiation twice a day, with about 6 hours between treatments.  You may need to change into a hospital gown. The radiation therapist puts you in the correct position on the treatment table, then leaves the room. Sometimes you may need more imaging before each treatment. The machine may take digital X-rays or a CT scan to help make sure you are lined up correctly. During treatment, lie as still as you can and breathe normally. You will hear noises coming from the machine. You can talk to the radiation therapist, who watches you from the control room on a TV monitor. After treatment, the therapist will help you off the table. You can then get dressed and go back to your normal activities.  After treatment  After your radiation treatments are done, you will have follow-up appointments. These are to make sure the cancer is under control. Tell your healthcare team about any side effects from the treatment. The team will help you manage them.  Date Last Reviewed: 6/1/2018 2000-2019 [employer]. [address] [place] [address], [place] [address]. All rights reserved. This information is not intended as a substitute for professional medical care. Always follow your healthcare professional's instructions.

## 2021-07-02 NOTE — H&P
H&P by Daina Ortega CNP at 12/2/2020  8:00 AM     Author: Jordan, Daina J, CNP Service: Interventional Radiology Author Type: Nurse Practitioner    Filed: 12/2/2020  8:17 AM Date of Service: 12/2/2020  8:00 AM Status: Signed    : Daina Ortega CNP (Nurse Practitioner)         Interventional Radiology - Pre-Procedure Note:  12/1/2020    Procedure Requested: Tunneled peritoneal drainage catheter placement   Requested by: Samara Maciel MD    Brief HPI: Jay Oneil is a 59 y.o. old male with history of metastatic pancreatic cancer with malignant ascites s/p paracentesis with 2.6 liters removed 10/27/20; 2.25 liters removed 10/20/20; 4 liters removed 9/30/20. Patient is now on Hospice. Presents today for placement of tunneled peritoneal drainage catheter to manage recurrent ascites at home. Hospice planning to see patient next on 12/4.    NPO: MN  ANTICOAGULANTS: None  ANTIBIOTICS: Ancef for procedure     ALLERGIES  Codeine and Tramadol    LABS:  INR (no units)   Date Value   10/23/2020 1.60 (H)     Hemoglobin (g/dL)   Date Value   10/27/2020 8.1 (L)     Platelets (thou/uL)   Date Value   10/27/2020 69 (L)     Creatinine (mg/dL)   Date Value   10/27/2020 0.68 (L)     Potassium (mmol/L)   Date Value   10/27/2020 4.2       EXAM:  Pulse 95   Temp 97.6  F (36.4  C) (Oral)   Resp 18   Wt 150 lb (68 kg)   SpO2 99%   BMI 22.15 kg/m    General: Stable. In no acute distress.  Neuro: A&O x 3.  Resp: Lungs diminished  Cardio: S1S2 and reg  Abdomen: Distended, non-tender  Skin: Without excoriations, ecchymosis, erythema, lesions or open sores on abdomen    Pre-Sedation Assessment:  Mallampati Airway Classification: Class 2: upper half of tonsil fossa visible  Previous reaction to anesthesia/sedation: no  Sedation plan based on assessment: Moderate  ASA Classification: ASA 3 - Patient with moderate systemic disease with functional limitations    ASSESSMENT/PLAN:   59 year old male with metastatic  pancreatic cancer with recurrent ascites. Patient on Hospice.     Tunneled peritoneal drainage catheter placement with sedation     Please drain all of patient's ascites/pleural effusion as recommended by Radiologist in procedure room following Aspira placement. This patient does not require additional post procedure educational drainage.      [facility] will fax Aspira gravity bag/dressing kit prescription order to [address] [place] [address] for patient to receive product at home. Patient will be provided 5 drainage and dressing kits following aspira catheter placement. Patients should call [phone] to contact [address] [place] [address] if they have questions or concerns about their Aspira drainage supplies. Number provided to patient in Aspira patient information folder.    Procedure, risk/benefits, and sedation reviewed with pt/family. All questions answered. OK to proceed with above radiology procedure.     Daina Ortega, CNP  Interventional Radiology

## 2021-07-03 NOTE — ADDENDUM NOTE
Addendum Note by Josuha Miguel MD at 8/10/2020  3:20 PM     Author: Joshua Miguel MD Service: -- Author Type: Physician    Filed: 8/10/2020  3:20 PM Encounter Date: 8/10/2020 Status: Signed    : Joshua Miguel MD (Physician)    Addended by: JOSHUA MIGUEL on: 8/10/2020 03:20 PM        Modules accepted: Orders

## 2021-07-03 NOTE — ADDENDUM NOTE
Addendum Note by Nette Haney RN at 6/19/2020  4:48 PM     Author: Nette Haney RN Service: -- Author Type: Registered Nurse    Filed: 6/19/2020  4:48 PM Encounter Date: 6/19/2020 Status: Signed    : Nette Haney RN (Registered Nurse)    Addended by: NETTE HANEY on: 6/19/2020 04:48 PM        Modules accepted: Orders

## 2021-08-03 PROBLEM — C15.5 MALIGNANT NEOPLASM OF LOWER THIRD OF ESOPHAGUS (H): Status: RESOLVED | Noted: 2020-01-01 | Resolved: 2020-01-01

## 2023-04-03 PROBLEM — C78.7 MALIGNANT NEOPLASM METASTATIC TO LIVER (H): Status: ACTIVE | Noted: 2020-01-01

## 2023-04-03 PROBLEM — C79.51 MALIGNANT NEOPLASM METASTATIC TO BONE (H): Status: ACTIVE | Noted: 2020-01-01
